# Patient Record
Sex: FEMALE | Race: WHITE | NOT HISPANIC OR LATINO | Employment: OTHER | URBAN - NONMETROPOLITAN AREA
[De-identification: names, ages, dates, MRNs, and addresses within clinical notes are randomized per-mention and may not be internally consistent; named-entity substitution may affect disease eponyms.]

---

## 2017-06-06 ENCOUNTER — TRANSFERRED RECORDS (OUTPATIENT)
Dept: HEALTH INFORMATION MANAGEMENT | Facility: HOSPITAL | Age: 62
End: 2017-06-06

## 2017-08-05 ENCOUNTER — HEALTH MAINTENANCE LETTER (OUTPATIENT)
Age: 62
End: 2017-08-05

## 2018-01-11 ENCOUNTER — OFFICE VISIT (OUTPATIENT)
Dept: FAMILY MEDICINE | Facility: OTHER | Age: 63
End: 2018-01-11
Attending: FAMILY MEDICINE
Payer: COMMERCIAL

## 2018-01-11 VITALS
TEMPERATURE: 98.1 F | HEIGHT: 64 IN | WEIGHT: 164 LBS | DIASTOLIC BLOOD PRESSURE: 78 MMHG | OXYGEN SATURATION: 96 % | BODY MASS INDEX: 28 KG/M2 | RESPIRATION RATE: 14 BRPM | SYSTOLIC BLOOD PRESSURE: 122 MMHG | HEART RATE: 88 BPM

## 2018-01-11 DIAGNOSIS — K21.9 GASTROESOPHAGEAL REFLUX DISEASE, ESOPHAGITIS PRESENCE NOT SPECIFIED: Primary | ICD-10-CM

## 2018-01-11 DIAGNOSIS — K21.9 LPRD (LARYNGOPHARYNGEAL REFLUX DISEASE): ICD-10-CM

## 2018-01-11 LAB
ERYTHROCYTE [DISTWIDTH] IN BLOOD BY AUTOMATED COUNT: 12.9 % (ref 10–15)
HCT VFR BLD AUTO: 40.4 % (ref 35–47)
HGB BLD-MCNC: 13.6 G/DL (ref 11.7–15.7)
MCH RBC QN AUTO: 30.4 PG (ref 26.5–33)
MCHC RBC AUTO-ENTMCNC: 33.7 G/DL (ref 31.5–36.5)
MCV RBC AUTO: 90 FL (ref 78–100)
PLATELET # BLD AUTO: 302 10E9/L (ref 150–450)
RBC # BLD AUTO: 4.48 10E12/L (ref 3.8–5.2)
WBC # BLD AUTO: 7.4 10E9/L (ref 4–11)

## 2018-01-11 PROCEDURE — 36415 COLL VENOUS BLD VENIPUNCTURE: CPT | Performed by: FAMILY MEDICINE

## 2018-01-11 PROCEDURE — 80053 COMPREHEN METABOLIC PANEL: CPT | Performed by: FAMILY MEDICINE

## 2018-01-11 PROCEDURE — 99214 OFFICE O/P EST MOD 30 MIN: CPT | Performed by: FAMILY MEDICINE

## 2018-01-11 PROCEDURE — 85027 COMPLETE CBC AUTOMATED: CPT | Performed by: FAMILY MEDICINE

## 2018-01-11 RX ORDER — OMEPRAZOLE 40 MG/1
40 CAPSULE, DELAYED RELEASE ORAL DAILY
Qty: 30 CAPSULE | Refills: 5 | Status: SHIPPED | OUTPATIENT
Start: 2018-01-11 | End: 2018-10-15

## 2018-01-11 ASSESSMENT — ANXIETY QUESTIONNAIRES
GAD7 TOTAL SCORE: 5
5. BEING SO RESTLESS THAT IT IS HARD TO SIT STILL: NOT AT ALL
2. NOT BEING ABLE TO STOP OR CONTROL WORRYING: SEVERAL DAYS
7. FEELING AFRAID AS IF SOMETHING AWFUL MIGHT HAPPEN: NOT AT ALL
3. WORRYING TOO MUCH ABOUT DIFFERENT THINGS: SEVERAL DAYS
6. BECOMING EASILY ANNOYED OR IRRITABLE: SEVERAL DAYS
1. FEELING NERVOUS, ANXIOUS, OR ON EDGE: SEVERAL DAYS
IF YOU CHECKED OFF ANY PROBLEMS ON THIS QUESTIONNAIRE, HOW DIFFICULT HAVE THESE PROBLEMS MADE IT FOR YOU TO DO YOUR WORK, TAKE CARE OF THINGS AT HOME, OR GET ALONG WITH OTHER PEOPLE: SOMEWHAT DIFFICULT

## 2018-01-11 ASSESSMENT — PATIENT HEALTH QUESTIONNAIRE - PHQ9
SUM OF ALL RESPONSES TO PHQ QUESTIONS 1-9: 5
5. POOR APPETITE OR OVEREATING: SEVERAL DAYS

## 2018-01-11 NOTE — MR AVS SNAPSHOT
After Visit Summary   1/11/2018    Soraida Mclaughlin    MRN: [id number]           Patient Information     Date Of Birth          1955        Visit Information        Provider Department      1/11/2018 3:30 PM Berna Hou MD [facility]        Today's Diagnoses     Gastroesophageal reflux disease, esophagitis presence not specified    -  1    LPRD (laryngopharyngeal reflux disease)           Follow-ups after your visit        Follow-up notes from your care team     Return if symptoms worsen or fail to improve.      Who to contact     If you have questions or need follow up information about today's clinic visit or your schedule please contact [facility] directly at [phone].  Normal or non-critical lab and imaging results will be communicated to you by MyChart, letter or phone within 4 business days after the clinic has received the results. If you do not hear from us within 7 days, please contact the clinic through Dealupahart or phone. If you have a critical or abnormal lab result, we will notify you by phone as soon as possible.  Submit refill requests through Soicos or call your pharmacy and they will forward the refill request to us. Please allow 3 business days for your refill to be completed.          Additional Information About Your Visit        MyChart Information     Soicos gives you secure access to your electronic health record. If you see a primary care provider, you can also send messages to your care team and make appointments. If you have questions, please call your primary care clinic.  If you do not have a primary care provider, please call [phone] and they will assist you.        Care EveryWhere ID     This is your Care EveryWhere ID. This could be used by other organizations to access your [place] medical records  [id number]        Your Vitals Were     Pulse Temperature Respirations Height Pulse Oximetry BMI (Body Mass Index)    88  "98.1  F (36.7  C) (Tympanic) 14 5' 4\" (1.626 m) 96% 28.15 kg/m2       Blood Pressure from Last 3 Encounters:   01/11/18 122/78   12/02/16 126/80   02/09/15 104/70    Weight from Last 3 Encounters:   01/11/18 164 lb (74.4 kg)   12/02/16 160 lb (72.6 kg)   02/09/15 175 lb (79.4 kg)              We Performed the Following     CBC with platelets     Comprehensive metabolic panel          Today's Medication Changes          These changes are accurate as of: 1/11/18 11:59 PM.  If you have any questions, ask your nurse or doctor.               These medicines have changed or have updated prescriptions.        Dose/Directions    omeprazole 40 MG capsule   Commonly known as:  priLOSEC   This may have changed:  See the new instructions.   Used for:  LPRD (laryngopharyngeal reflux disease), Gastroesophageal reflux disease, esophagitis presence not specified   Changed by:  Berna Hou MD        Dose:  40 mg   Take 1 capsule (40 mg) by mouth daily   Quantity:  30 capsule   Refills:  5            Where to get your medicines      These medications were sent to [facility] [id number] [place] [address]  AT [facility] [address]  [address] [facility] [address]     Phone:  [phone]     omeprazole 40 MG capsule                Primary Care Provider Office Phone # Fax #    Berna oHu [phone] [address] [facility] [address]        Equal Access to Services     ARPAN SÁNCHEZ AH: Hadii aad ku hadasho Soomaali, waaxda luqadaha, qaybta kaalmada adeegyada, waxay destin queen. So [facility] [phone].    ATENCIÓN: Si habla español, tiene a vásquez disposición servicios gratuitos de asistencia lingüística. Llame al [phone].    We comply with applicable federal civil rights laws and [place] laws. We do not discriminate on the basis of race, color, national origin, age, disability, sex, sexual orientation, or gender " identity.            Thank you!     Thank you for choosing [facility]  for your care. Our goal is always to provide you with excellent care. Hearing back from our patients is one way we can continue to improve our services. Please take a few minutes to complete the written survey that you may receive in the mail after your visit with us. Thank you!             Your Updated Medication List - Protect others around you: Learn how to safely use, store and throw away your medicines at www.disposemymeds.org.          This list is accurate as of: 1/11/18 11:59 PM.  Always use your most recent med list.                   Brand Name Dispense Instructions for use Diagnosis    cholecalciferol 1000 UNIT tablet    vitamin D3     Take 2 tablets by mouth daily        fish oil-omega-3 fatty acids 1000 MG capsule      Take 2 g by mouth daily        levothyroxine 50 MCG tablet    SYNTHROID/LEVOTHROID     Take 50 mcg by mouth daily        omeprazole 40 MG capsule    priLOSEC    30 capsule    Take 1 capsule (40 mg) by mouth daily    LPRD (laryngopharyngeal reflux disease), Gastroesophageal reflux disease, esophagitis presence not specified       WELLBUTRIN  MG 24 hr tablet   Generic drug:  buPROPion      Take 1 tablet by mouth daily.        ZOCOR 10 MG tablet   Generic drug:  simvastatin      Take 1 tablet by mouth every evening.

## 2018-01-11 NOTE — NURSING NOTE
"Chief Complaint   Patient presents with     Abdominal Pain     heartburn     Other     Dentist is concerned about lack of clotting.       Initial /78 (BP Location: Left arm, Patient Position: Sitting, Cuff Size: Adult Regular)  Pulse 88  Temp 98.1  F (36.7  C) (Tympanic)  Resp 14  Ht 5' 4\" (1.626 m)  Wt 164 lb (74.4 kg)  SpO2 96%  BMI 28.15 kg/m2 Estimated body mass index is 28.15 kg/(m^2) as calculated from the following:    Height as of this encounter: 5' 4\" (1.626 m).    Weight as of this encounter: 164 lb (74.4 kg).  Medication Reconciliation: carmina Lerma      "

## 2018-01-12 DIAGNOSIS — K21.9 GASTROESOPHAGEAL REFLUX DISEASE, ESOPHAGITIS PRESENCE NOT SPECIFIED: ICD-10-CM

## 2018-01-12 LAB
ALBUMIN SERPL-MCNC: 4.2 G/DL (ref 3.4–5)
ALP SERPL-CCNC: 119 U/L (ref 40–150)
ALT SERPL W P-5'-P-CCNC: 31 U/L (ref 0–50)
ANION GAP SERPL CALCULATED.3IONS-SCNC: 8 MMOL/L (ref 3–14)
AST SERPL W P-5'-P-CCNC: 22 U/L (ref 0–45)
BILIRUB SERPL-MCNC: 0.4 MG/DL (ref 0.2–1.3)
BUN SERPL-MCNC: 18 MG/DL (ref 7–30)
CALCIUM SERPL-MCNC: 8.9 MG/DL (ref 8.5–10.1)
CHLORIDE SERPL-SCNC: 108 MMOL/L (ref 94–109)
CO2 SERPL-SCNC: 27 MMOL/L (ref 20–32)
CREAT SERPL-MCNC: 0.69 MG/DL (ref 0.52–1.04)
GFR SERPL CREATININE-BSD FRML MDRD: 86 ML/MIN/1.7M2
GLUCOSE SERPL-MCNC: 83 MG/DL (ref 70–99)
POTASSIUM SERPL-SCNC: 4.2 MMOL/L (ref 3.4–5.3)
PROT SERPL-MCNC: 7.7 G/DL (ref 6.8–8.8)
SODIUM SERPL-SCNC: 143 MMOL/L (ref 133–144)

## 2018-01-12 PROCEDURE — 99000 SPECIMEN HANDLING OFFICE-LAB: CPT | Performed by: FAMILY MEDICINE

## 2018-01-12 PROCEDURE — 87338 HPYLORI STOOL AG IA: CPT | Mod: 90 | Performed by: FAMILY MEDICINE

## 2018-01-12 ASSESSMENT — ANXIETY QUESTIONNAIRES: GAD7 TOTAL SCORE: 5

## 2018-01-15 LAB
H PYLORI AG STL QL IA: NORMAL
SPECIMEN SOURCE: NORMAL

## 2018-03-14 ENCOUNTER — TRANSFERRED RECORDS (OUTPATIENT)
Dept: HEALTH INFORMATION MANAGEMENT | Facility: CLINIC | Age: 63
End: 2018-03-14

## 2018-03-19 ENCOUNTER — OFFICE VISIT (OUTPATIENT)
Dept: FAMILY MEDICINE | Facility: OTHER | Age: 63
End: 2018-03-19
Attending: FAMILY MEDICINE
Payer: COMMERCIAL

## 2018-03-19 VITALS
HEART RATE: 92 BPM | DIASTOLIC BLOOD PRESSURE: 84 MMHG | SYSTOLIC BLOOD PRESSURE: 132 MMHG | OXYGEN SATURATION: 98 % | TEMPERATURE: 97.9 F | RESPIRATION RATE: 14 BRPM | HEIGHT: 64 IN | WEIGHT: 166 LBS | BODY MASS INDEX: 28.34 KG/M2

## 2018-03-19 DIAGNOSIS — F41.9 ANXIETY: ICD-10-CM

## 2018-03-19 DIAGNOSIS — G44.209 TENSION HEADACHE: ICD-10-CM

## 2018-03-19 DIAGNOSIS — K21.9 GASTROESOPHAGEAL REFLUX DISEASE, ESOPHAGITIS PRESENCE NOT SPECIFIED: Primary | ICD-10-CM

## 2018-03-19 PROCEDURE — 99214 OFFICE O/P EST MOD 30 MIN: CPT | Performed by: FAMILY MEDICINE

## 2018-03-19 RX ORDER — BUPROPION HYDROCHLORIDE 150 MG/1
300 TABLET ORAL DAILY
Qty: 30 TABLET | Refills: 2 | Status: SHIPPED | OUTPATIENT
Start: 2018-03-19 | End: 2019-08-08

## 2018-03-19 ASSESSMENT — PAIN SCALES - GENERAL: PAINLEVEL: MILD PAIN (3)

## 2018-03-19 ASSESSMENT — ANXIETY QUESTIONNAIRES
GAD7 TOTAL SCORE: 3
4. TROUBLE RELAXING: SEVERAL DAYS
6. BECOMING EASILY ANNOYED OR IRRITABLE: SEVERAL DAYS
5. BEING SO RESTLESS THAT IT IS HARD TO SIT STILL: NOT AT ALL
7. FEELING AFRAID AS IF SOMETHING AWFUL MIGHT HAPPEN: NOT AT ALL
2. NOT BEING ABLE TO STOP OR CONTROL WORRYING: NOT AT ALL
IF YOU CHECKED OFF ANY PROBLEMS ON THIS QUESTIONNAIRE, HOW DIFFICULT HAVE THESE PROBLEMS MADE IT FOR YOU TO DO YOUR WORK, TAKE CARE OF THINGS AT HOME, OR GET ALONG WITH OTHER PEOPLE: SOMEWHAT DIFFICULT
1. FEELING NERVOUS, ANXIOUS, OR ON EDGE: SEVERAL DAYS
3. WORRYING TOO MUCH ABOUT DIFFERENT THINGS: NOT AT ALL

## 2018-03-19 NOTE — PROGRESS NOTES
SUBJECTIVE:   Soraida Mclaughlin is a 62 year old female who presents to clinic today for the following health issues:      ED/UC Followup:    Facility[facility]  [facility]  Date of visit: 3/14/18  Reason for visit: Chest pains - reflux  Current Status: Not currently having chest pain.          Headache  Onset: 2 weeks     Description:   Location: bilateral in the frontal area & sometimes on left side of head  Character: dull pain  Frequency:  Off and on for two weeks  Duration:  1 hour    Intensity: mild    Progression of Symptoms:  same    Accompanying Signs & Symptoms: jaw pain  Stiff neck: no   Neck or upper back pain: no   Fever: no   Sinus pressure: no   Nausea or vomiting: no   Dizziness: no   Numbness: no   Weakness: no   Visual changes: no     History:   Head trauma: no   Family history of migraines: no   Previous tests for headaches: no   Neurologist evaluations: no   Able to do daily activities: YES  Wake with a headaches: no   Do headaches wake you up: no   Daily pain medication use: no   Work/school stressors/changes: YES    Precipitating factors:   Does light make it worse: YES  Does sound make it worse: no     Alleviating factors:  Does sleep help: YES- sometimes    Therapies Tried and outcome: Tylenol or ibuprofen do help    Patient notes she is under a lot of stress.  She lost her family pet just before the headaches started.  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  She is tearful today discussing all the stressors she has.      Problem list and histories reviewed & adjusted, as indicated.  Additional history: as documented    Patient Active Problem List   Diagnosis     Other specified cardiac dysrhythmias(427.89)     JAMIE (obstructive sleep apnea)     Deviated nasal septum     Nasal turbinate hypertrophy     Benign essential hypertension     Hyperlipidemia     Osteoarthrosis, unspecified whether generalized or localized, ankle and foot     Hypothyroidism     Esophageal reflux      Multinodular goiter     Advanced care planning/counseling discussion     Post-operative state     Past Surgical History:   Procedure Laterality Date     childbirth x 3       fusion 2nd & 3rd metatarsal      LT     hardware removal foot  2004    LT     HERNIA REPAIR       ORTHOPEDIC SURGERY       TONSILLECTOMY Bilateral 9/23/2014    Procedure: TONSILLECTOMY;  Surgeon: Caroline Gao MD;  Location: [facility]     TURBINOPLASTY Bilateral 9/23/2014    Procedure: TURBINOPLASTY;  Surgeon: Caroline Gao MD;  Location: [facility]     UVULOPALATOPHARYNGOPLASTY N/A 9/23/2014    Procedure: UVULOPALATOPHARYNGOPLASTY;  Surgeon: Caroline Gao MD;  Location: [facility] OR       Social History   Substance Use Topics     Smoking status: Former Smoker     Packs/day: 1.00     Years: 10.00     Types: Cigarettes     Smokeless tobacco: Never Used      Comment: quit in 1991.     Alcohol use Yes      Comment: socially 2 drinks/ twice a week     Family History   Problem Relation Age of Onset     CANCER Mother      pancreatic; cause of death     CEREBROVASCULAR DISEASE Father 66     cause of death     Other - See Comments Maternal Grandmother      goiter     Other - See Comments Sister      graves disease     Thyroid Disease Other      hyperthyroidism     Thyroid Disease Other      hypothyroidsim     DIABETES Other      HEART DISEASE Sister      myocardial infarction     Asthma No family hx of          Current Outpatient Prescriptions   Medication Sig Dispense Refill     buPROPion (WELLBUTRIN XL) 150 MG 24 hr tablet Take 2 tablets (300 mg) by mouth daily 30 tablet 2     omeprazole (PRILOSEC) 40 MG capsule Take 1 capsule (40 mg) by mouth daily 30 capsule 5     levothyroxine (SYNTHROID, LEVOTHROID) 50 MCG tablet Take 50 mcg by mouth daily        fish oil-omega-3 fatty acids (FISH OIL) 1000 MG capsule Take 2 g by mouth daily        cholecalciferol (VITAMIN D3) 1000 UNIT tablet Take 2 tablets by mouth daily        simvastatin (ZOCOR) 10 MG  "tablet Take 1 tablet by mouth every evening.       [DISCONTINUED] buPROPion (WELLBUTRIN XL) 150 MG 24 hr tablet Take 1 tablet by mouth daily.       Allergies   Allergen Reactions     Mold        Reviewed and updated as needed this visit by clinical staff  Allergies  Meds  Problems       Reviewed and updated as needed this visit by Provider         ROS:  Constitutional, HEENT, cardiovascular, pulmonary, gi and gu systems are negative, except as otherwise noted.    OBJECTIVE:     /84 (BP Location: Right arm, Patient Position: Chair, Cuff Size: Adult Regular)  Pulse 92  Temp 97.9  F (36.6  C) (Tympanic)  Resp 14  Ht 5' 4\" (1.626 m)  Wt 166 lb (75.3 kg)  SpO2 98%  BMI 28.49 kg/m2  Body mass index is 28.49 kg/(m^2).  GENERAL: healthy, alert and no distress  PSYCH: mentation appears normal, tearful and anxious    Diagnostic Test Results:  Evaluation from ER reviewed in Care Everywhere    ASSESSMENT/PLAN:     1. Gastroesophageal reflux disease, esophagitis presence not specified  Patient will restart daily omeprazole for now.    2. Anxiety  Will increase Wellbutrin to 300 mg daily.  Follow-up in 2-3 weeks for reassessment of symptoms.  - buPROPion (WELLBUTRIN XL) 150 MG 24 hr tablet; Take 2 tablets (300 mg) by mouth daily  Dispense: 30 tablet; Refill: 2    3. Tension headache  Tylenol/ibuprofen, ice, and posture are all discussed.  Will reassess symptoms in 2 weeks, sooner if worsening.          Berna Hou MD  [facility]"

## 2018-03-19 NOTE — NURSING NOTE
"Chief Complaint   Patient presents with     ER F/U     Headache       Initial /84 (BP Location: Right arm, Patient Position: Chair, Cuff Size: Adult Regular)  Pulse 92  Temp 97.9  F (36.6  C) (Tympanic)  Resp 14  Ht 5' 4\" (1.626 m)  Wt 166 lb (75.3 kg)  SpO2 98%  BMI 28.49 kg/m2 Estimated body mass index is 28.49 kg/(m^2) as calculated from the following:    Height as of this encounter: 5' 4\" (1.626 m).    Weight as of this encounter: 166 lb (75.3 kg).  Medication Reconciliation: complete     Olga Dennis   "

## 2018-03-19 NOTE — MR AVS SNAPSHOT
After Visit Summary   3/19/2018    Soraida Mclaughlin    MRN: [id number]           Patient Information     Date Of Birth          1955        Visit Information        Provider Department      3/19/2018 8:30 AM Berna Hou MD [facility]        Today's Diagnoses     Gastroesophageal reflux disease, esophagitis presence not specified    -  1    Anxiety        Tension headache           Follow-ups after your visit        Follow-up notes from your care team     Return in about 3 weeks (around 4/9/2018).      Your next 10 appointments already scheduled     Apr 03, 2018  9:30 AM CDT   (Arrive by 9:15 AM)   SHORT with Berna Hou MD   [facility] ([facility] )    [address]  [facility] [address]   [phone]              Who to contact     If you have questions or need follow up information about today's clinic visit or your schedule please contact [facility] directly at [phone].  Normal or non-critical lab and imaging results will be communicated to you by TRIBAXhart, letter or phone within 4 business days after the clinic has received the results. If you do not hear from us within 7 days, please contact the clinic through ClickPay Servicest or phone. If you have a critical or abnormal lab result, we will notify you by phone as soon as possible.  Submit refill requests through VouchedFor or call your pharmacy and they will forward the refill request to us. Please allow 3 business days for your refill to be completed.          Additional Information About Your Visit        MyChart Information     VouchedFor gives you secure access to your electronic health record. If you see a primary care provider, you can also send messages to your care team and make appointments. If you have questions, please call your primary care clinic.  If you do not have a primary care provider, please call [phone] and they will assist you.    "     Care EveryWhere ID     This is your Care EveryWhere ID. This could be used by other organizations to access your [place] medical records  [id number]        Your Vitals Were     Pulse Temperature Respirations Height Pulse Oximetry BMI (Body Mass Index)    92 97.9  F (36.6  C) (Tympanic) 14 5' 4\" (1.626 m) 98% 28.49 kg/m2       Blood Pressure from Last 3 Encounters:   03/19/18 132/84   01/11/18 122/78   12/02/16 126/80    Weight from Last 3 Encounters:   03/19/18 166 lb (75.3 kg)   01/11/18 164 lb (74.4 kg)   12/02/16 160 lb (72.6 kg)              Today, you had the following     No orders found for display         Today's Medication Changes          These changes are accurate as of 3/19/18 10:27 AM.  If you have any questions, ask your nurse or doctor.               These medicines have changed or have updated prescriptions.        Dose/Directions    buPROPion 150 MG 24 hr tablet   Commonly known as:  WELLBUTRIN XL   This may have changed:  how much to take   Used for:  Anxiety   Changed by:  Berna Hou MD        Dose:  300 mg   Take 2 tablets (300 mg) by mouth daily   Quantity:  30 tablet   Refills:  2         Stop taking these medicines if you haven't already. Please contact your care team if you have questions.     ranitidine 300 MG tablet   Commonly known as:  ZANTAC   Stopped by:  Berna Hou MD                Where to get your medicines      These medications were sent to [facility] [address] [place] [address]  AT [facility] [address] [place] [address]     Phone:  [phone]     buPROPion 150 MG 24 hr tablet                Primary Care Provider Office Phone # Fax #    Berna Hou [phone] [address] [facility] [address]        Equal Access to Services     CATHERINE SÁNCHEZ AH: Sean trejo Sokymberly, waaxda luqadaha, qaybta kaalmanupur keys " lamarifer astorga So [facility] [phone].    ATENCIÓN: Si habla maria ines, tiene a vásquez disposición servicios gratuitos de asistencia lingüística. Natasha al [phone].    We comply with applicable federal civil rights laws and [place] laws. We do not discriminate on the basis of race, color, national origin, age, disability, sex, sexual orientation, or gender identity.            Thank you!     Thank you for choosing [facility]  for your care. Our goal is always to provide you with excellent care. Hearing back from our patients is one way we can continue to improve our services. Please take a few minutes to complete the written survey that you may receive in the mail after your visit with us. Thank you!             Your Updated Medication List - Protect others around you: Learn how to safely use, store and throw away your medicines at www.disposemymeds.org.          This list is accurate as of 3/19/18 10:27 AM.  Always use your most recent med list.                   Brand Name Dispense Instructions for use Diagnosis    buPROPion 150 MG 24 hr tablet    WELLBUTRIN XL    30 tablet    Take 2 tablets (300 mg) by mouth daily    Anxiety       cholecalciferol 1000 UNIT tablet    vitamin D3     Take 2 tablets by mouth daily        fish oil-omega-3 fatty acids 1000 MG capsule      Take 2 g by mouth daily        levothyroxine 50 MCG tablet    SYNTHROID/LEVOTHROID     Take 50 mcg by mouth daily        omeprazole 40 MG capsule    priLOSEC    30 capsule    Take 1 capsule (40 mg) by mouth daily    LPRD (laryngopharyngeal reflux disease), Gastroesophageal reflux disease, esophagitis presence not specified       ZOCOR 10 MG tablet   Generic drug:  simvastatin      Take 1 tablet by mouth every evening.

## 2018-03-20 ASSESSMENT — ANXIETY QUESTIONNAIRES: GAD7 TOTAL SCORE: 3

## 2018-03-20 ASSESSMENT — PATIENT HEALTH QUESTIONNAIRE - PHQ9: SUM OF ALL RESPONSES TO PHQ QUESTIONS 1-9: 5

## 2018-10-15 ENCOUNTER — OFFICE VISIT (OUTPATIENT)
Dept: FAMILY MEDICINE | Facility: OTHER | Age: 63
End: 2018-10-15
Attending: FAMILY MEDICINE
Payer: COMMERCIAL

## 2018-10-15 VITALS
OXYGEN SATURATION: 95 % | SYSTOLIC BLOOD PRESSURE: 128 MMHG | DIASTOLIC BLOOD PRESSURE: 72 MMHG | RESPIRATION RATE: 16 BRPM | TEMPERATURE: 98.4 F | WEIGHT: 172.4 LBS | HEART RATE: 93 BPM | BODY MASS INDEX: 29.43 KG/M2 | HEIGHT: 64 IN

## 2018-10-15 DIAGNOSIS — S82.141S CLOSED FRACTURE OF RIGHT TIBIAL PLATEAU, SEQUELA: ICD-10-CM

## 2018-10-15 DIAGNOSIS — I10 BENIGN ESSENTIAL HYPERTENSION: ICD-10-CM

## 2018-10-15 DIAGNOSIS — E03.9 HYPOTHYROIDISM, UNSPECIFIED TYPE: ICD-10-CM

## 2018-10-15 DIAGNOSIS — Z01.818 PREOP GENERAL PHYSICAL EXAM: Primary | ICD-10-CM

## 2018-10-15 DIAGNOSIS — Z98.890 S/P ORIF (OPEN REDUCTION INTERNAL FIXATION) FRACTURE: ICD-10-CM

## 2018-10-15 DIAGNOSIS — E78.5 HYPERLIPIDEMIA, UNSPECIFIED HYPERLIPIDEMIA TYPE: ICD-10-CM

## 2018-10-15 DIAGNOSIS — Z87.81 S/P ORIF (OPEN REDUCTION INTERNAL FIXATION) FRACTURE: ICD-10-CM

## 2018-10-15 LAB — HGB BLD-MCNC: 14.1 G/DL (ref 11.7–15.7)

## 2018-10-15 PROCEDURE — 93000 ELECTROCARDIOGRAM COMPLETE: CPT | Performed by: INTERNAL MEDICINE

## 2018-10-15 PROCEDURE — 99214 OFFICE O/P EST MOD 30 MIN: CPT | Mod: 25 | Performed by: FAMILY MEDICINE

## 2018-10-15 PROCEDURE — 85018 HEMOGLOBIN: CPT | Performed by: FAMILY MEDICINE

## 2018-10-15 PROCEDURE — 36415 COLL VENOUS BLD VENIPUNCTURE: CPT | Performed by: FAMILY MEDICINE

## 2018-10-15 ASSESSMENT — ANXIETY QUESTIONNAIRES
2. NOT BEING ABLE TO STOP OR CONTROL WORRYING: SEVERAL DAYS
7. FEELING AFRAID AS IF SOMETHING AWFUL MIGHT HAPPEN: NOT AT ALL
5. BEING SO RESTLESS THAT IT IS HARD TO SIT STILL: NOT AT ALL
GAD7 TOTAL SCORE: 3
IF YOU CHECKED OFF ANY PROBLEMS ON THIS QUESTIONNAIRE, HOW DIFFICULT HAVE THESE PROBLEMS MADE IT FOR YOU TO DO YOUR WORK, TAKE CARE OF THINGS AT HOME, OR GET ALONG WITH OTHER PEOPLE: NOT DIFFICULT AT ALL
3. WORRYING TOO MUCH ABOUT DIFFERENT THINGS: NOT AT ALL
6. BECOMING EASILY ANNOYED OR IRRITABLE: SEVERAL DAYS
1. FEELING NERVOUS, ANXIOUS, OR ON EDGE: SEVERAL DAYS

## 2018-10-15 ASSESSMENT — PATIENT HEALTH QUESTIONNAIRE - PHQ9: 5. POOR APPETITE OR OVEREATING: NOT AT ALL

## 2018-10-15 ASSESSMENT — PAIN SCALES - GENERAL: PAINLEVEL: MILD PAIN (2)

## 2018-10-15 NOTE — NURSING NOTE
"Chief Complaint   Patient presents with     Pre-Op Exam       Initial /72 (BP Location: Left arm, Patient Position: Sitting, Cuff Size: Adult Regular)  Pulse 93  Temp 98.4  F (36.9  C) (Tympanic)  Resp 16  Ht 5' 4\" (1.626 m)  Wt 172 lb 6.4 oz (78.2 kg)  SpO2 95%  BMI 29.59 kg/m2 Estimated body mass index is 29.59 kg/(m^2) as calculated from the following:    Height as of this encounter: 5' 4\" (1.626 m).    Weight as of this encounter: 172 lb 6.4 oz (78.2 kg).  Medication Reconciliation: complete    Elvira Rodriguez MA  "

## 2018-10-15 NOTE — MR AVS SNAPSHOT
After Visit Summary   10/15/2018    Soraida Mclaughlin    MRN: [id number]           Patient Information     Date Of Birth          1955        Visit Information        Provider Department      10/15/2018 11:00 AM Berna Hou MD [facility]        Today's Diagnoses     Preop general physical exam    -  1    S/P ORIF (open reduction internal fixation) fracture        Closed fracture of right tibial plateau, sequela        Hyperlipidemia, unspecified hyperlipidemia type        Benign essential hypertension        Hypothyroidism, unspecified type          Care Instructions      Before Your Surgery      Call your surgeon if there is any change in your health. This includes signs of a cold or flu (such as a sore throat, runny nose, cough, rash or fever).    Do not smoke, drink alcohol or take over the counter medicine (unless your surgeon or primary care doctor tells you to) for the 24 hours before and after surgery.    If you take prescribed drugs: Follow your doctor s orders about which medicines to take and which to stop until after surgery.    Eating and drinking prior to surgery: follow the instructions from your surgeon    Take a shower or bath the night before surgery. Use the soap your surgeon gave you to gently clean your skin. If you do not have soap from your surgeon, use your regular soap. Do not shave or scrub the surgery site.  Wear clean pajamas and have clean sheets on your bed.           Follow-ups after your visit        Follow-up notes from your care team     Return if symptoms worsen or fail to improve.      Who to contact     If you have questions or need follow up information about today's clinic visit or your schedule please contact [facility] directly at [phone].  Normal or non-critical lab and imaging results will be communicated to you by MyChart, letter or phone within 4 business days after the clinic has received the  "results. If you do not hear from us within 7 days, please contact the clinic through This Week In or phone. If you have a critical or abnormal lab result, we will notify you by phone as soon as possible.  Submit refill requests through This Week In or call your pharmacy and they will forward the refill request to us. Please allow 3 business days for your refill to be completed.          Additional Information About Your Visit        This Week In Information     This Week In gives you secure access to your electronic health record. If you see a primary care provider, you can also send messages to your care team and make appointments. If you have questions, please call your primary care clinic.  If you do not have a primary care provider, please call [phone] and they will assist you.        Care EveryWhere ID     This is your Care EveryWhere ID. This could be used by other organizations to access your [place] medical records  [id number]        Your Vitals Were     Pulse Temperature Respirations Height Pulse Oximetry BMI (Body Mass Index)    93 98.4  F (36.9  C) (Tympanic) 16 5' 4\" (1.626 m) 95% 29.59 kg/m2       Blood Pressure from Last 3 Encounters:   10/15/18 128/72   03/19/18 132/84   01/11/18 122/78    Weight from Last 3 Encounters:   10/15/18 172 lb 6.4 oz (78.2 kg)   03/19/18 166 lb (75.3 kg)   01/11/18 164 lb (74.4 kg)              We Performed the Following     EKG 12-lead complete w/read - (Clinic Performed)     Hemoglobin          Today's Medication Changes          These changes are accurate as of 10/15/18  1:00 PM.  If you have any questions, ask your nurse or doctor.               These medicines have changed or have updated prescriptions.        Dose/Directions    buPROPion 150 MG 24 hr tablet   Commonly known as:  WELLBUTRIN XL   This may have changed:  how much to take   Used for:  Anxiety        Dose:  300 mg   Take 2 tablets (300 mg) by mouth daily   Quantity:  30 tablet   Refills:  2                Primary Care " Provider Office Phone # Fax #    Berna Hou [phone] [facility] [address]        Equal Access to Services     CATHERINE SÁNCHEZ : Sean Flores, becca paredes, juan miguelarley gonzalezlakshmijuan alberto zunigaapriljuan alberto, nupur bladein hayaacharlie araujopatricemario queen. So [facility] [phone].    ATENCIÓN: Si habla español, tiene a vásquez disposición servicios gratuitos de asistencia lingüística. Llame al [phone].    We comply with applicable federal civil rights laws and [place] laws. We do not discriminate on the basis of race, color, national origin, age, disability, sex, sexual orientation, or gender identity.            Thank you!     Thank you for choosing [facility]  for your care. Our goal is always to provide you with excellent care. Hearing back from our patients is one way we can continue to improve our services. Please take a few minutes to complete the written survey that you may receive in the mail after your visit with us. Thank you!             Your Updated Medication List - Protect others around you: Learn how to safely use, store and throw away your medicines at www.disposemymeds.org.          This list is accurate as of 10/15/18  1:00 PM.  Always use your most recent med list.                   Brand Name Dispense Instructions for use Diagnosis    buPROPion 150 MG 24 hr tablet    WELLBUTRIN XL    30 tablet    Take 2 tablets (300 mg) by mouth daily    Anxiety       cholecalciferol 1000 UNIT tablet    vitamin D3     Take 2 tablets by mouth daily        fish oil-omega-3 fatty acids 1000 MG capsule      Take 2 g by mouth daily        levothyroxine 50 MCG tablet    SYNTHROID/LEVOTHROID     Take 50 mcg by mouth daily        ZOCOR 10 MG tablet   Generic drug:  simvastatin      Take 20 mg by mouth every evening
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
## 2018-10-16 ASSESSMENT — ANXIETY QUESTIONNAIRES: GAD7 TOTAL SCORE: 3

## 2018-10-16 ASSESSMENT — PATIENT HEALTH QUESTIONNAIRE - PHQ9: SUM OF ALL RESPONSES TO PHQ QUESTIONS 1-9: 3

## 2018-10-24 ENCOUNTER — TRANSFERRED RECORDS (OUTPATIENT)
Dept: HEALTH INFORMATION MANAGEMENT | Facility: CLINIC | Age: 63
End: 2018-10-24

## 2019-03-03 ENCOUNTER — TRANSFERRED RECORDS (OUTPATIENT)
Dept: HEALTH INFORMATION MANAGEMENT | Facility: CLINIC | Age: 64
End: 2019-03-03

## 2019-03-03 LAB
ALT SERPL-CCNC: 40 IU/L (ref 6–31)
AST SERPL-CCNC: 33 IU/L (ref 10–40)
CREAT SERPL-MCNC: 0.75 MG/DL (ref 0.4–1)
GFR SERPL CREATININE-BSD FRML MDRD: >60 ML/MIN/1.73M2
GLUCOSE SERPL-MCNC: 90 MG/DL (ref 70–99)
POTASSIUM SERPL-SCNC: 3.8 MEQ/L (ref 3.4–5.1)
TSH SERPL-ACNC: 7.83 UIU/ML (ref 0.4–3.99)

## 2019-03-11 NOTE — PROGRESS NOTES
"  SUBJECTIVE:                                                    Soraida Mclaughlin is a 63 year old female who presents to clinic today for the following health issues:        ED/UC Followup:    Facility:  [facility]  Date of visit: 3/3/2019  Reason for visit: Palpitations  Current Status: stressed          Palpatations      Duration: week    Description (location/character/radiation): Had went out that night - went to brew tasting- had a couple different drinks (only did different/mixture of different drink) went home     Intensity:  moderate    Accompanying signs and symptoms: lightheadedness/ heart palpations \"felt like her heart would stop\" - high B/P 170/112  P  (over 100- possibly 150- can't recall)     History (similar episodes/previous evaluation): years ago- back in college- stress induced  and five years ago ( pulse was fast- put on metoprolol - then seemed to regulate itself- went off medication    Precipitating or alleviating factors: not certain if the mixture of drinks - had an affect on it     Therapies tried and outcome: metoprolol the night of her ER visit     Patient denies any further episodes of symptoms.  She wonders if the work-up ordered in the ER is necessary (echo, Holter, Cardiology).  If necessary, she would like work-up ordered at [facility].           Problem list and histories reviewed & adjusted, as indicated.  Additional history: as documented    Patient Active Problem List   Diagnosis     Other specified cardiac dysrhythmias(427.89)     JAMIE (obstructive sleep apnea)     Deviated nasal septum     Nasal turbinate hypertrophy     Benign essential hypertension     Hyperlipidemia     Osteoarthrosis, unspecified whether generalized or localized, ankle and foot     Hypothyroidism     Esophageal reflux     Multinodular goiter     Advanced care planning/counseling discussion     Post-operative state     Past Surgical History:   Procedure Laterality Date     childbirth x 3       fusion 2nd & 3rd " metatarsal      LT     hardware removal foot  2004    LT     HERNIA REPAIR       ORTHOPEDIC SURGERY       TONSILLECTOMY Bilateral 2014    Procedure: TONSILLECTOMY;  Surgeon: Caroline Gao MD;  [facility]: [facility]     TURBINOPLASTY Bilateral 2014    Procedure: TURBINOPLASTY;  Surgeon: Caroline Gao MD;  [facility]     UVULOPALATOPHARYNGOPLASTY N/A 2014    Procedure: UVULOPALATOPHARYNGOPLASTY;  Surgeon: Caroline Gao MD;  Location: [facility]       Social History     Tobacco Use     Smoking status: Former Smoker     Packs/day: 1.00     Years: 10.00     Pack years: 10.00     Types: Cigarettes     Last attempt to quit: 1991     Years since quittin.2     Smokeless tobacco: Never Used     Tobacco comment: quit in .   Substance Use Topics     Alcohol use: Yes     Comment: socially 2 drinks/ twice a week     Family History   Problem Relation Age of Onset     Cancer Mother         pancreatic; cause of death     Cerebrovascular Disease Father 66        cause of death     Other - See Comments Maternal Grandmother         goiter     Other - See Comments Sister         graves disease     Thyroid Disease Other         hyperthyroidism     Thyroid Disease Other         hypothyroidsim     Diabetes Other      Heart Disease Sister         myocardial infarction     Asthma No family hx of          Current Outpatient Medications   Medication Sig Dispense Refill     buPROPion (WELLBUTRIN XL) 150 MG 24 hr tablet Take 2 tablets (300 mg) by mouth daily (Patient taking differently: Take 150 mg by mouth daily ) 30 tablet 2     cholecalciferol (VITAMIN D3) 1000 UNIT tablet Take 2 tablets by mouth daily        fish oil-omega-3 fatty acids (FISH OIL) 1000 MG capsule Take 2 g by mouth daily        levothyroxine (SYNTHROID, LEVOTHROID) 50 MCG tablet Take 50 mcg by mouth daily        simvastatin (ZOCOR) 10 MG tablet Take 20 mg by mouth every evening        Allergies   Allergen Reactions     Mold   "      ROS:  Constitutional, HEENT, cardiovascular, pulmonary, gi and gu systems are negative, except as otherwise noted.    OBJECTIVE:     /89 (BP Location: Right arm, Patient Position: Chair, Cuff Size: Adult Large)   Pulse 77   Temp 99.2  F (37.3  C) (Tympanic)   Ht 1.626 m (5' 4\")   Wt 75.8 kg (167 lb)   SpO2 95%   BMI 28.67 kg/m    Body mass index is 28.67 kg/m .  GENERAL: healthy, alert and no distress  PSYCH: mentation appears normal, affect normal/bright    Diagnostic Test Results:  none     ASSESSMENT/PLAN:     1. Palpitations  Zio patch ordered for one week, and Cardiology referral ordered to follow.  With alcohol inducing symptoms, I would worry about intermittent a fib.  Go to ER if palpitations return, follow-up here as directed.  Patient expresses understanding and is happy with today's plan.  - Zio Patch (RANGE) Adult Pediatric > 48 hours; Future  - CARDIOLOGY EVAL ADULT REFERRAL        Berna Hou MD  [facility] [place] [facility]      "

## 2019-03-12 ENCOUNTER — OFFICE VISIT (OUTPATIENT)
Dept: FAMILY MEDICINE | Facility: OTHER | Age: 64
End: 2019-03-12
Attending: FAMILY MEDICINE
Payer: COMMERCIAL

## 2019-03-12 VITALS
DIASTOLIC BLOOD PRESSURE: 89 MMHG | SYSTOLIC BLOOD PRESSURE: 130 MMHG | BODY MASS INDEX: 28.51 KG/M2 | HEART RATE: 77 BPM | WEIGHT: 167 LBS | HEIGHT: 64 IN | TEMPERATURE: 99.2 F | OXYGEN SATURATION: 95 %

## 2019-03-12 DIAGNOSIS — R00.2 PALPITATIONS: Primary | ICD-10-CM

## 2019-03-12 PROCEDURE — 99213 OFFICE O/P EST LOW 20 MIN: CPT | Performed by: FAMILY MEDICINE

## 2019-03-12 ASSESSMENT — PAIN SCALES - GENERAL: PAINLEVEL: NO PAIN (0)

## 2019-03-12 ASSESSMENT — MIFFLIN-ST. JEOR: SCORE: 1292.51

## 2019-03-12 NOTE — NURSING NOTE
"Chief Complaint   Patient presents with     Heart Problem       Initial /89 (BP Location: Right arm, Patient Position: Chair, Cuff Size: Adult Large)   Pulse 77   Temp 99.2  F (37.3  C) (Tympanic)   Ht 1.626 m (5' 4\")   Wt 75.8 kg (167 lb)   SpO2 95%   BMI 28.67 kg/m   Estimated body mass index is 28.67 kg/m  as calculated from the following:    Height as of this encounter: 1.626 m (5' 4\").    Weight as of this encounter: 75.8 kg (167 lb).  Medication Reconciliation: complete    Deb Dixon LPN  "

## 2019-03-13 ENCOUNTER — HOSPITAL ENCOUNTER (OUTPATIENT)
Dept: CARDIOLOGY | Facility: HOSPITAL | Age: 64
Discharge: HOME | End: 2019-03-13
Attending: FAMILY MEDICINE | Admitting: FAMILY MEDICINE
Payer: COMMERCIAL

## 2019-03-13 DIAGNOSIS — R00.2 PALPITATIONS: ICD-10-CM

## 2019-03-13 PROCEDURE — 0298T ZIO PATCH HOLTER ADULT PEDIATRIC GREATER THAN 48 HRS: CPT | Performed by: INTERNAL MEDICINE

## 2019-03-13 PROCEDURE — 0296T ZIO PATCH HOLTER ADULT PEDIATRIC GREATER THAN 48 HRS: CPT | Mod: TC

## 2019-04-19 ENCOUNTER — TELEPHONE (OUTPATIENT)
Dept: CARDIOLOGY | Facility: OTHER | Age: 64
End: 2019-04-19
Payer: COMMERCIAL

## 2019-04-19 ENCOUNTER — OFFICE VISIT (OUTPATIENT)
Dept: CARDIOLOGY | Facility: OTHER | Age: 64
End: 2019-04-19
Attending: FAMILY MEDICINE
Payer: COMMERCIAL

## 2019-04-19 VITALS
BODY MASS INDEX: 30.56 KG/M2 | SYSTOLIC BLOOD PRESSURE: 130 MMHG | HEART RATE: 86 BPM | HEIGHT: 64 IN | WEIGHT: 179 LBS | DIASTOLIC BLOOD PRESSURE: 70 MMHG | RESPIRATION RATE: 16 BRPM | OXYGEN SATURATION: 98 % | TEMPERATURE: 98.2 F

## 2019-04-19 DIAGNOSIS — I47.10 PSVT (PAROXYSMAL SUPRAVENTRICULAR TACHYCARDIA) (H): Primary | ICD-10-CM

## 2019-04-19 DIAGNOSIS — G47.33 OSA (OBSTRUCTIVE SLEEP APNEA): ICD-10-CM

## 2019-04-19 DIAGNOSIS — I10 BENIGN ESSENTIAL HYPERTENSION: ICD-10-CM

## 2019-04-19 DIAGNOSIS — E78.2 MIXED HYPERLIPIDEMIA: ICD-10-CM

## 2019-04-19 DIAGNOSIS — K21.9 GASTROESOPHAGEAL REFLUX DISEASE WITHOUT ESOPHAGITIS: ICD-10-CM

## 2019-04-19 DIAGNOSIS — R00.2 PALPITATIONS: ICD-10-CM

## 2019-04-19 DIAGNOSIS — I10 BENIGN ESSENTIAL HYPERTENSION: Primary | ICD-10-CM

## 2019-04-19 DIAGNOSIS — E03.9 HYPOTHYROIDISM, UNSPECIFIED TYPE: ICD-10-CM

## 2019-04-19 DIAGNOSIS — Z79.899 ON STATIN THERAPY: ICD-10-CM

## 2019-04-19 DIAGNOSIS — Z87.891 HISTORY OF TOBACCO ABUSE: ICD-10-CM

## 2019-04-19 PROCEDURE — 99204 OFFICE O/P NEW MOD 45 MIN: CPT | Performed by: INTERNAL MEDICINE

## 2019-04-19 PROCEDURE — 93000 ELECTROCARDIOGRAM COMPLETE: CPT | Performed by: INTERNAL MEDICINE

## 2019-04-19 ASSESSMENT — PAIN SCALES - GENERAL: PAINLEVEL: NO PAIN (0)

## 2019-04-19 ASSESSMENT — MIFFLIN-ST. JEOR: SCORE: 1346.94

## 2019-04-19 NOTE — PATIENT INSTRUCTIONS
You were seen by Dr. Grande, 4/19/2019.     1.  You may choose to do nothing for the palpations if they are not bothersome.        2.  If you may choose to treat the palpitations with daily medications or medication when the symptoms present.    3.  You may choose to have a consult with electrophysiology to discuss possible options.     4.  Please continue all medications as they have been prescribed.     5.  If you develop new or worsening symptoms please call the cardiology office as you may need to be seen sooner.     6.  Please follow up with Dr. Morgan and have your labs done at that time as you request .    7.  You will be referred to sleep medicine for evaluation of sleep apnea. You will be notified to schedule this appointment.       You will follow up with Dr. Grande as needed.       Please call the cardiology office with problems, questions, or concerns at [phone].    If you experience chest pain, chest pressure, chest tightness, shortness of breath, fainting, lightheadedness, nausea, vomiting, or other concerning symptoms, please report to the Emergency Department or call 911. These symptoms may be emergent, and best treated in the Emergency Department.       Lizeth ZEPEDA RN-BSN  Cardiology   [facility]  [phone]      Patient Education     Understanding Heart Palpitations    Heart palpitations are a symptom. It s the feeling you have when your heartbeat seems to be racing, pounding, skipping, or fluttering. Heart palpitations are most often felt in the chest. Sometimes, they may also be felt in the neck.  What causes heart palpitations?  In most cases, heart palpitations are caused by:    Stress or anxiety    Exercise    Pregnancy    Some medicines    Caffeine    Nicotine    Alcohol    Illegal drugs, such as cocaine    Health problems, such as anemia or overactive thyroid  In some cases, heart palpitations may be caused by a problem with the heart. Abnormal heart rhythms  (arrhythmias) are the main concern. They may need to be managed by you and your healthcare provider or treated right away.  How are heart palpitations treated?  Treatments for heart palpitations depend on the cause. Options may include:    Managing the things that trigger your heart palpitations. This could mean:  ? Learning ways to reduce stress and anxiety  ? Avoiding caffeine, nicotine, alcohol, or illegal drugs  ? Stopping the use of certain medicines, under your doctor s guidance    Medicines, procedures, or surgery to treat an arrhythmia or other health problem that is causing your symptoms  What are the complications of heart palpitations?  Complications of heart palpitations are rare unless they are caused by a problem such as an arrhythmia. In such cases, complications can include:    Fainting    Heart failure. This problem occurs when the heart is so weak it no longer pumps blood well.    Blood clots and stroke    Sudden cardiac arrest. This problem occurs when the heart suddenly stops beating.  When should I call my healthcare provider?  Call your healthcare provider right away if you have any of these:    Fever of 100.4 F (38 C) or higher, or as directed    Symptoms that don t get better with treatment, or symptoms that get worse    New symptoms, such as chest pain, shortness of breath, dizziness, or fainting   Date Last Reviewed: 5/1/2016 2000-2018 [employer]. [address] [place] [address], [place] [address]. All rights reserved. This information is not intended as a substitute for professional medical care. Always follow your healthcare professional's instructions.           Patient Education     Valsalva Maneuver  Valsalva maneuver is used to slow the heart when it is beating in a supraventricular tachycardia (SVT). This set of physical maneuvers can trigger a relaxation reflex in the heart's electrical system. This slows down the heart rate and the speed at which the heart's electrical  signals travel down specialized wires. If an SVT is using some of the heart's normal electrical pathways, these maneuvers may slow or stop the abnormal heart rhythm right away.   If you have another episode of SVT that does not stop within a few minutes of lying down, you may try the Valsalva maneuver:    Sit or lie down.    Take a deep breath and hold it.    Now bear down hard with your stomach muscles, as if you were trying to have a bowel movement.    Strain hard and hold the strain for 10 to 15 seconds. The harder you strain, the more likely the maneuver may work.    If this doesn t stop your symptoms, wait for at least 1 minute and try a second time.  What to do next  If this procedure does stop your episode of SVT, call your healthcare provider and let him or her know what happened.  If this procedure does not stop your SVT and the palpitations continue for more than 20 minutes, you may need to go to the emergency department for treatment. Also go right away if you have any of these symptoms along with the palpitations:    Pain in your chest, shoulder, arm, neck, or upper back    Shortness of breath    Weakness    Fainting or lightheadedness  Don't go to your healthcare provider s office or to a clinic instead of the ER. These places will not be able to give you all the testing and treatment needed for this condition.  If symptoms are mild, don t drive yourself to the ER. Have someone else drive you.  Call 911  Call 911 if your symptoms are severe. This is the fastest and safest way to get to the ER, because paramedics can start treatment on the way to the hospital.   Date Last Reviewed: 5/1/2016 2000-2018 [employer]. [address] [place] [address], [place] [address]. All rights reserved. This information is not intended as a substitute for professional medical care. Always follow your healthcare professional's instructions.

## 2019-04-19 NOTE — PROGRESS NOTES
[facility] HEART CARE   CARDIOLOGY CONSULT     Soraida Mclaughlin   1955  [id number]     Berna Hurtado     Chief Complaint   Patient presents with     Heart Problem     Palpitations.          HPI:   Mrs. Mclaughlin is a 63-year-old female who is being seen by cardiology for palpitations.  She was identified as having PSVT, PAC's, and PVC's. She also has a history of JAMIE status post tonsillectomy, GERD, hyperlipidemia, hypertension, history of tobacco abuse quitting in 1/1/91, hypothyroidism, and is currently on statin therapy.    She has had palpitations since her teens or 20's.  She describes having sporadic symptoms as she can go months without symptoms but will have symptoms on a weekly basis.  Generally, her symptoms last seconds.  She will get lightheaded but has never had syncope or near syncope.  She was seen here on 3/3/19 with an extended run of symptoms which she felt lasted for up to 30 minutes.  She feels that her symptoms are complicated by anxiety and is uncertain how much of her symptoms were anxiety related versus rhythm related.      She was appropriately set up with a Zio patch which was performed on 3/13/19.  She was identified with x19 runs of PSVT lasting up to 9.1-seconds, SVE, and VE.  Her symptoms did correlate to PSVT.    In the past, she has been on metoprolol.  She is currently not on this medication.  We discussed ablation versus pill in the pocket versus maintenance medication versus doing nothing.  She was relieved to know that nothing life-threatening was identified as monitor.  For now, she would like to try conservative approach.    We also discussed her history of sleep apnea. Apparently, she was diagnosed with sleep apnea in the past but has since had her tonsils removed as they were described as being large.  She is uncertain at this time if she has sleep apnea.  She was willing to be referred to pulmonary for assessment.    She does not have a lipid panel and A1c on file.   She did have a lipid panel obtained at outside hospital.  She is currently a Zocor 20 mg.  It was suggested she have labs but was turned down.  She would like to discuss it with her primary.  She has a history of smoking.  She quit 1/1/91 after a pack a day.      IMAGING RESULTS:   This is a Zio XT patch report on patient Janeen Mclaughlin ordered by Dr. Morgan for palpitations.    Enrollment period was from 3/13/19 through 3/20/19.  There is 6 days and 20 hours of analysis time available for review.    Patient has underlying sinus rhythm.    The minimum heart rate is 55, average heart rate 93 maximum heart rate 159   bpm when in sinus rhythm.  The fastest heart rate was 235 bpm during a   brief episode of SVT.    There is no significant bradycardia, pauses or heart block seen.    There were rare isolated PACs.  There were 19 episodes of what was felt to be supraventricular tachycardia   4 beats or more duration.  The fastest was for 5 beats with an average HR   of 224 bpm.  The longest was for 9.1 seconds with an average heart rate of 142 bpm.    There were rare isolated PVC's.  There were some episodes of bigeminy and trigeminy seen.  No ventricular   tachycardia.    There is 6 triggered events.  All with underlying sinus rhythm.  3 of them   did have short episodes of SVT lasting anywhere from 5-9 beats.  The other   3 events had isolated PVC's.    There were 2 diary entries for skipped irregular beats.  One had a PVC.    Second did have a short run of SVT.  Lasting 10 beats.    ALLERGIES:   Allergies   Allergen Reactions     Mold         PAST MEDICAL HISTORY:   Past Medical History:   Diagnosis Date     Allergic rhinitis 9/6/2012     DNS (deviated nasal septum) 9/6/2012     Dysphagia 9/6/2012     Esophageal reflux 9/21/2010     Hypertrophy of inferior nasal turbinate 9/6/2012     Multinodular goiter 1/14/2013     JAMIE (obstructive sleep apnea) 9/6/2012     Osteoarthrosis, unspecified whether generalized or  localized, ankle and foot 7/30/2002     Other and unspecified hyperlipidemia 9/21/2010     Unspecified acquired hypothyroidism 9/21/2010     Unspecified essential hypertension 9/21/2010     Uvular hypertrophy 9/6/2012        PAST SURGICAL HISTORY:   Past Surgical History:   Procedure Laterality Date     childbirth x 3       fusion 2nd & 3rd metatarsal      LT     hardware removal foot  2004    LT     HERNIA REPAIR       ORTHOPEDIC SURGERY       TONSILLECTOMY Bilateral 9/23/2014    Procedure: TONSILLECTOMY;  Surgeon: Caroline Gao MD;  Location: [facility]     TURBINOPLASTY Bilateral 9/23/2014    Procedure: TURBINOPLASTY;  Surgeon: Caroline Gao MD;  Location: [facility]     UVULOPALATOPHARYNGOPLASTY N/A 9/23/2014    Procedure: UVULOPALATOPHARYNGOPLASTY;  Surgeon: Caroline Gao MD;  Location: [facility]        FAMILY HISTORY:   Family History   Problem Relation Age of Onset     Cancer Mother         pancreatic; cause of death     Cerebrovascular Disease Father 66        cause of death     Other - See Comments Maternal Grandmother         goiter     Other - See Comments Sister         graves disease     Thyroid Disease Other         hyperthyroidism     Thyroid Disease Other         hypothyroidsim     Diabetes Other      Heart Disease Sister         myocardial infarction     Asthma No family hx of         SOCIAL HISTORY:   Social History     Socioeconomic History     Marital status:      Spouse name: None     Number of children: None     Years of education: None     Highest education level: None   Occupational History     Occupation: [employer] social    Social Needs     Financial resource strain: None     Food insecurity:     Worry: None     Inability: None     Transportation needs:     Medical: None     Non-medical: None   Tobacco Use     Smoking status: Former Smoker     Packs/day: 1.00     Years: 10.00     Pack years: 10.00     Types: Cigarettes     Last attempt  to quit: 1991     Years since quittin.3     Smokeless tobacco: Never Used     Tobacco comment: quit in .   Substance and Sexual Activity     Alcohol use: Yes     Comment: socially 2 drinks/ twice a week     Drug use: No     Comment: quit 20 years ago     Sexual activity: None   Lifestyle     Physical activity:     Days per week: None     Minutes per session: None     Stress: None   Relationships     Social connections:     Talks on phone: None     Gets together: None     Attends [employer] service: None     Active member of club or organization: None     Attends meetings of clubs or organizations: None     Relationship status: None     Intimate partner violence:     Fear of current or ex partner: None     Emotionally abused: None     Physically abused: None     Forced sexual activity: None   Other Topics Concern      Service Not Asked     Blood Transfusions Yes     Caffeine Concern Yes     Comment: coffee - 2 cups daily     Occupational Exposure Not Asked     Hobby Hazards Not Asked     Sleep Concern Not Asked     Stress Concern Not Asked     Weight Concern Not Asked     Special Diet Not Asked     Back Care Not Asked     Exercise Not Asked     Bike Helmet Not Asked     Seat Belt Not Asked     Self-Exams Not Asked     Parent/sibling w/ CABG, MI or angioplasty before 65F 55M? Yes     Comment: sister-mi at 39   Social History Narrative     None         CURRENT MEDICATIONS:   Prior to Admission medications    Medication Sig Start Date End Date Taking? Authorizing Provider   buPROPion (WELLBUTRIN XL) 150 MG 24 hr tablet Take 2 tablets (300 mg) by mouth daily  Patient taking differently: Take 150 mg by mouth daily  3/19/18  Yes Berna Hou MD   cholecalciferol (VITAMIN D3) 1000 UNIT tablet Take 2 tablets by mouth daily    Yes Reported, Patient   fish oil-omega-3 fatty acids (FISH OIL) 1000 MG capsule Take 2 g by mouth daily    Yes Reported, Patient   levothyroxine (SYNTHROID, LEVOTHROID) 50  MCG tablet Take 50 mcg by mouth daily    Yes Reported, Patient   simvastatin (ZOCOR) 10 MG tablet Take 20 mg by mouth every evening    Yes Reported, Patient          ROS:   CONSTITUTIONAL: No weight loss, fever, chills, weakness or fatigue.   HEENT: Eyes: No visual changes. Ears, Nose, Throat: No hearing loss, congestion or difficulty swallowing.   CARDIOVASCULAR: No chest pain, chest pressure or chest discomfort.  Infrequent palpitations but no lower extremity edema.   RESPIRATORY: No shortness of breath, dyspnea upon exertion, cough or sputum production.   GASTROINTESTINAL: No abdominal pain. No anorexia, nausea, vomiting or diarrhea.   NEUROLOGICAL: No headache, lightheadedness, dizziness, syncope, ataxia or weakness.   HEMATOLOGIC: No anemia, bleeding or bruising.   PSYCHIATRIC: No history of depression or anxiety.   ENDOCRINOLOGIC: No reports of sweating, cold or heat intolerance. No polyuria or polydipsia.   SKIN: No abnormal rashes or itching.       PHYSICAL EXAM:   GENERAL: The patient is a well-developed, well-nourished, in no apparent distress. Alert and oriented x3.   HEENT: Head is normocephalic and atraumatic. Eyes are symmetrical with normal visual tracking.  HEART: Regular rate and rhythm, S1S2 present without murmur, rub or gallop.   LUNGS: Respirations regular and unlabored. Clear to auscultation.   GI: Abdomen is soft and nondistended.   EXTREMITIES: No peripheral edema present.   MUSCULOSKELETAL: No joint swelling.   NEUROLOGIC: Alert and oriented X3.    SKIN: No jaundice. No rashes or visible skin lesions present.       LAB RESULTS:   Transferred Records on 03/03/2019   Component Date Value Ref Range Status     TSH 03/03/2019 7.83* 0.40 - 3.99 uIU/mL Final     ALT 03/03/2019 40* 6 - 31 IU/L Final     AST 03/03/2019 33  10 - 40 IU/L Final     Glucose 03/03/2019 90  70 - 99 mg/dL Final     Creatinine 03/03/2019 0.75  0.40 - 1.00 mg/dL Final     GFR Estimate 03/03/2019 >60  >60 ml/min/1.73m2 Final      Potassium 03/03/2019 3.8  3.4 - 5.1 mEq/L Final            ASSESSMENT:       ICD-10-CM    1. PSVT (paroxysmal supraventricular tachycardia) (H) I47.1    2. Palpitations R00.2    3. JAMIE without CPAP G47.33 SLEEP EVALUATION & MANAGEMENT REFERRAL - [facility] [place] [phone] (Age 5 and up)   4. GERD K21.9    5. Mixed hyperlipidemia E78.2    6. Benign essential hypertension I10    7. History of tobacco abuse quitting on 1/1/91 Z87.891    8. Hypothyroidism, unspecified type E03.9    9. On statin therapy Z79.899          PLAN:   1.  We discussed her palpitations as described above.  She was given the options of doing nothing, pill in the pocket, maintenance medication, or consideration for ablation.  She is reassured that nothing life-threatening was identified on the monitor.  Since her symptoms are infrequent, she has chosen a more conservative route and she does not want to be on a medication.  For now, she has turned down surgical intervention, pill in the pocket, or maintenance medications.  If her symptoms accelerate, she may be willing to readdress medical treatment in the future.  2.  She was diagnosed with JAMIE in the past.  She had her tonsils removed improving the situation but she is uncertain if her sleep apnea has completely resolved.  He has been referred to pulmonary for consideration for JAMIE treatment.  3.  It was suggested she have a lipid panel and A1c.  She declined these labs today and would like to readdress this with her primary in the future.  4.  She will be seen in the future on a as needed basis.      Thank you for allowing me to participate in the care of your patient. Please do not hesitate to contact me if you have any questions.     Dmitriy Grande, DO

## 2019-04-19 NOTE — NURSING NOTE
"Chief Complaint   Patient presents with     Heart Problem     Palpitations.       Initial /70 (BP Location: Right arm, Patient Position: Chair, Cuff Size: Adult Regular)   Pulse 86   Temp 98.2  F (36.8  C) (Tympanic)   Resp 16   Ht 1.626 m (5' 4\")   Wt 81.2 kg (179 lb)   SpO2 98%   BMI 30.73 kg/m   Estimated body mass index is 30.73 kg/m  as calculated from the following:    Height as of this encounter: 1.626 m (5' 4\").    Weight as of this encounter: 81.2 kg (179 lb).  Medication Reconciliation: complete    RICHARDSON COFFMAN LPN    "

## 2019-07-29 ENCOUNTER — TRANSFERRED RECORDS (OUTPATIENT)
Dept: HEALTH INFORMATION MANAGEMENT | Facility: CLINIC | Age: 64
End: 2019-07-29

## 2019-08-08 ENCOUNTER — OFFICE VISIT (OUTPATIENT)
Dept: FAMILY MEDICINE | Facility: OTHER | Age: 64
End: 2019-08-08
Attending: FAMILY MEDICINE
Payer: COMMERCIAL

## 2019-08-08 ENCOUNTER — TELEPHONE (OUTPATIENT)
Dept: FAMILY MEDICINE | Facility: OTHER | Age: 64
End: 2019-08-08

## 2019-08-08 VITALS
RESPIRATION RATE: 16 BRPM | OXYGEN SATURATION: 99 % | HEART RATE: 98 BPM | SYSTOLIC BLOOD PRESSURE: 138 MMHG | TEMPERATURE: 99.2 F | BODY MASS INDEX: 29.16 KG/M2 | WEIGHT: 170.8 LBS | DIASTOLIC BLOOD PRESSURE: 72 MMHG | HEIGHT: 64 IN

## 2019-08-08 DIAGNOSIS — S30.1XXD HEMATOMA OF RECTUS SHEATH, SUBSEQUENT ENCOUNTER: Primary | ICD-10-CM

## 2019-08-08 DIAGNOSIS — E78.5 HYPERLIPIDEMIA, UNSPECIFIED HYPERLIPIDEMIA TYPE: ICD-10-CM

## 2019-08-08 DIAGNOSIS — E03.9 HYPOTHYROIDISM, UNSPECIFIED TYPE: ICD-10-CM

## 2019-08-08 DIAGNOSIS — F41.9 ANXIETY: ICD-10-CM

## 2019-08-08 DIAGNOSIS — Z90.49 S/P CHOLECYSTECTOMY: ICD-10-CM

## 2019-08-08 LAB
ALBUMIN SERPL-MCNC: 3.5 G/DL (ref 3.4–5)
ALP SERPL-CCNC: 206 U/L (ref 40–150)
ALT SERPL W P-5'-P-CCNC: 71 U/L (ref 0–50)
ANION GAP SERPL CALCULATED.3IONS-SCNC: 6 MMOL/L (ref 3–14)
AST SERPL W P-5'-P-CCNC: 31 U/L (ref 0–45)
BILIRUB SERPL-MCNC: 0.7 MG/DL (ref 0.2–1.3)
BUN SERPL-MCNC: 11 MG/DL (ref 7–30)
CALCIUM SERPL-MCNC: 9.2 MG/DL (ref 8.5–10.1)
CHLORIDE SERPL-SCNC: 108 MMOL/L (ref 94–109)
CHOLEST SERPL-MCNC: 192 MG/DL
CO2 SERPL-SCNC: 27 MMOL/L (ref 20–32)
CREAT SERPL-MCNC: 0.67 MG/DL (ref 0.52–1.04)
ERYTHROCYTE [DISTWIDTH] IN BLOOD BY AUTOMATED COUNT: 13 % (ref 10–15)
GFR SERPL CREATININE-BSD FRML MDRD: >90 ML/MIN/{1.73_M2}
GLUCOSE SERPL-MCNC: 103 MG/DL (ref 70–99)
HCT VFR BLD AUTO: 38 % (ref 35–47)
HDLC SERPL-MCNC: 45 MG/DL
HGB BLD-MCNC: 12.7 G/DL (ref 11.7–15.7)
LDLC SERPL CALC-MCNC: 121 MG/DL
MCH RBC QN AUTO: 30.7 PG (ref 26.5–33)
MCHC RBC AUTO-ENTMCNC: 33.4 G/DL (ref 31.5–36.5)
MCV RBC AUTO: 92 FL (ref 78–100)
NONHDLC SERPL-MCNC: 147 MG/DL
PLATELET # BLD AUTO: 391 10E9/L (ref 150–450)
POTASSIUM SERPL-SCNC: 4.1 MMOL/L (ref 3.4–5.3)
PROT SERPL-MCNC: 7.6 G/DL (ref 6.8–8.8)
RBC # BLD AUTO: 4.14 10E12/L (ref 3.8–5.2)
SODIUM SERPL-SCNC: 141 MMOL/L (ref 133–144)
TRIGL SERPL-MCNC: 128 MG/DL
TSH SERPL DL<=0.005 MIU/L-ACNC: 2.77 MU/L (ref 0.4–4)
WBC # BLD AUTO: 7.6 10E9/L (ref 4–11)

## 2019-08-08 PROCEDURE — 36415 COLL VENOUS BLD VENIPUNCTURE: CPT | Performed by: FAMILY MEDICINE

## 2019-08-08 PROCEDURE — 84443 ASSAY THYROID STIM HORMONE: CPT | Performed by: FAMILY MEDICINE

## 2019-08-08 PROCEDURE — 80061 LIPID PANEL: CPT | Performed by: FAMILY MEDICINE

## 2019-08-08 PROCEDURE — 80053 COMPREHEN METABOLIC PANEL: CPT | Performed by: FAMILY MEDICINE

## 2019-08-08 PROCEDURE — 99214 OFFICE O/P EST MOD 30 MIN: CPT | Performed by: FAMILY MEDICINE

## 2019-08-08 PROCEDURE — 85027 COMPLETE CBC AUTOMATED: CPT | Performed by: FAMILY MEDICINE

## 2019-08-08 RX ORDER — SIMVASTATIN 20 MG
20 TABLET ORAL AT BEDTIME
Qty: 90 TABLET | Refills: 3 | Status: SHIPPED | OUTPATIENT
Start: 2019-08-08 | End: 2020-08-19

## 2019-08-08 RX ORDER — LEVOTHYROXINE SODIUM 50 UG/1
50 TABLET ORAL DAILY
Qty: 90 TABLET | Refills: 3 | Status: SHIPPED | OUTPATIENT
Start: 2019-08-08 | End: 2020-08-19

## 2019-08-08 RX ORDER — BUPROPION HYDROCHLORIDE 150 MG/1
150 TABLET ORAL DAILY
Qty: 90 TABLET | Refills: 3 | Status: SHIPPED | OUTPATIENT
Start: 2019-08-08 | End: 2020-08-19

## 2019-08-08 ASSESSMENT — PAIN SCALES - GENERAL: PAINLEVEL: NO PAIN (0)

## 2019-08-08 ASSESSMENT — MIFFLIN-ST. JEOR: SCORE: 1309.74

## 2019-08-08 NOTE — PROGRESS NOTES
Subjective     Soraida Mclaughlin is a 63 year old female who presents to clinic today for the following health issues:    HPI       Hospital Follow-up Visit:    Hospital/Nursing Home/IP Rehab Facility: [facility]  Date of Admission: 07/29/19  Date of Discharge: 08/01/19  Reason(s) for Admission: Gastroenteritis, cholecystitis            Problems taking medications regularly:  None       Medication changes since discharge: None       Problems adhering to non-medication therapy:  None    Summary of hospitalization:  CareEverywhere information obtained and reviewed  Diagnostic Tests/Treatments reviewed.  Follow up needed: Surgeon requested follow-up Hgb and LFTs  Other Healthcare Providers Involved in Patient s Care:         Surgical follow-up appointment - Dr. Padilla  Update since discharge: improved.     Post Discharge Medication Reconciliation: discharge medications reconciled, continue medications without change.  Plan of care communicated with patient     Coding guidelines for this visit:  Type of Medical   Decision Making Face-to-Face Visit       within 7 Days of discharge Face-to-Face Visit        within 14 days of discharge   Moderate Complexity [id number]   High Complexity [id number]            Hyperlipidemia Follow-Up      Are you having any of the following symptoms? (Select all that apply)  No complaints of shortness of breath, chest pain or pressure.  No increased sweating or nausea with activity.  No left-sided neck or arm pain.  No complaints of pain in calves when walking 1-2 blocks.    Are you regularly taking any medication or supplement to lower your cholesterol?   Yes- simvastatin    Are you having muscle aches or other side effects that you think could be caused by your cholesterol lowering medication?  No      Depression and Anxiety Follow-Up    How are you doing with your depression since your last visit? No change    How are you doing with your anxiety since your last visit?  No  change    Are you having other symptoms that might be associated with depression or anxiety? No    Have you had a significant life event? No     Do you have any concerns with your use of alcohol or other drugs? No    Social History     Tobacco Use     Smoking status: Former Smoker     Packs/day: 1.00     Years: 10.00     Pack years: 10.00     Types: Cigarettes     Last attempt to quit: 1991     Years since quittin.6     Smokeless tobacco: Never Used     Tobacco comment: quit in .   Substance Use Topics     Alcohol use: Yes     Comment: socially 2 drinks/ twice a week     Drug use: No     Comment: quit 20 years ago     PHQ 2018 3/19/2018 10/15/2018   PHQ-9 Total Score 5 5 3   Q9: Thoughts of better off dead/self-harm past 2 weeks Not at all Not at all Not at all     DEYANIRA-7 SCORE 2018 3/19/2018 10/15/2018   Total Score 5 3 3       Suicide Assessment Five-step Evaluation and Treatment (SAFE-T)      Hypothyroidism Follow-up      Since last visit, patient describes the following symptoms: Weight stable, no hair loss, no skin changes, no constipation, no loose stools      Patient Active Problem List   Diagnosis     Other specified cardiac dysrhythmias(427.89)     JAMIE without CPAP     Deviated nasal septum     Nasal turbinate hypertrophy     Benign essential hypertension     Mixed hyperlipidemia     Osteoarthrosis, unspecified whether generalized or localized, ankle and foot     Hypothyroidism     GERD     Multinodular goiter     Advanced care planning/counseling discussion     Post-operative state     Palpitations     PSVT (paroxysmal supraventricular tachycardia) (H)     History of tobacco abuse quitting on 91     On statin therapy     Past Surgical History:   Procedure Laterality Date     childbirth x 3       fusion 2nd & 3rd metatarsal      LT     GALLBLADDER SURGERY  2019     hardware removal foot  2004    LT     HERNIA REPAIR       ORTHOPEDIC SURGERY       TONSILLECTOMY Bilateral  2014    Procedure: TONSILLECTOMY;  Surgeon: Caroline Gao MD;  Location: [facility]     TURBINOPLASTY Bilateral 2014    Procedure: TURBINOPLASTY;  Surgeon: Caroline Gao MD;  Location: [facility]     UVULOPALATOPHARYNGOPLASTY N/A 2014    Procedure: UVULOPALATOPHARYNGOPLASTY;  Surgeon: Caroline Gao MD;  Location: [facility]       Social History     Tobacco Use     Smoking status: Former Smoker     Packs/day: 1.00     Years: 10.00     Pack years: 10.00     Types: Cigarettes     Last attempt to quit: 1991     Years since quittin.6     Smokeless tobacco: Never Used     Tobacco comment: quit in .   Substance Use Topics     Alcohol use: Yes     Comment: socially 2 drinks/ twice a week     Family History   Problem Relation Age of Onset     Cancer Mother         pancreatic; cause of death     Cerebrovascular Disease Father 66        cause of death     Other - See Comments Maternal Grandmother         goiter     Other - See Comments Sister         graves disease     Thyroid Disease Other         hyperthyroidism     Thyroid Disease Other         hypothyroidsim     Diabetes Other      Heart Disease Sister         myocardial infarction     Asthma No family hx of          Current Outpatient Medications   Medication Sig Dispense Refill     buPROPion (WELLBUTRIN XL) 150 MG 24 hr tablet Take 2 tablets (300 mg) by mouth daily (Patient taking differently: Take 150 mg by mouth daily ) 30 tablet 2     cholecalciferol (VITAMIN D3) 1000 UNIT tablet Take 2 tablets by mouth daily        fish oil-omega-3 fatty acids (FISH OIL) 1000 MG capsule Take 2 g by mouth daily        levothyroxine (SYNTHROID, LEVOTHROID) 50 MCG tablet Take 50 mcg by mouth daily        simvastatin (ZOCOR) 10 MG tablet Take 20 mg by mouth every evening        Allergies   Allergen Reactions     Mold        Reviewed and updated as needed this visit by Provider         Review of Systems   ROS COMP: Constitutional, HEENT,  "cardiovascular, pulmonary, gi and gu systems are negative, except as otherwise noted.      Objective    /72 (BP Location: Left arm, Patient Position: Sitting, Cuff Size: Adult Regular)   Pulse 98   Temp 99.2  F (37.3  C) (Tympanic)   Resp 16   Ht 1.626 m (5' 4\")   Wt 77.5 kg (170 lb 12.8 oz)   SpO2 99%   BMI 29.32 kg/m    Body mass index is 29.32 kg/m .  Physical Exam   GENERAL: healthy, alert and no distress  PSYCH: mentation appears normal, affect normal/bright    Diagnostic Test Results:  Ordered as requested        Assessment & Plan     1. Hematoma of rectus sheath, subsequent encounter  Labs ordered, patient states symptoms have improved.  - CBC with platelets    2. S/P cholecystectomy  We talked about normal diarrhea following surgery.  Labs ordered as requested.  - Comprehensive metabolic panel    3. Hyperlipidemia, unspecified hyperlipidemia type  Labs updated, will renew medications  - Lipid Profile    4. Anxiety  Will renew medications.    5. Hypothyroidism, unspecified type  Labs updated, will renew medications.  - TSH with free T4 reflex     BMI:   Estimated body mass index is 29.32 kg/m  as calculated from the following:    Height as of this encounter: 1.626 m (5' 4\").    Weight as of this encounter: 77.5 kg (170 lb 12.8 oz).         Return in about 6 months (around 2/8/2020) for Chronic Disease Management.    Berna Hou MD  [facility]      "

## 2019-08-08 NOTE — NURSING NOTE
"Chief Complaint   Patient presents with     Hospital F/U       Initial /72 (BP Location: Left arm, Patient Position: Sitting, Cuff Size: Adult Regular)   Pulse 98   Temp 99.2  F (37.3  C) (Tympanic)   Resp 16   Ht 1.626 m (5' 4\")   Wt 77.5 kg (170 lb 12.8 oz)   SpO2 99%   BMI 29.32 kg/m   Estimated body mass index is 29.32 kg/m  as calculated from the following:    Height as of this encounter: 1.626 m (5' 4\").    Weight as of this encounter: 77.5 kg (170 lb 12.8 oz).  Medication Reconciliation: complete  "

## 2019-08-15 ENCOUNTER — TELEPHONE (OUTPATIENT)
Dept: FAMILY MEDICINE | Facility: OTHER | Age: 64
End: 2019-08-15

## 2019-08-15 NOTE — TELEPHONE ENCOUNTER
Patient s/p cholecystectomy   Two weeks   Liver enzymes were up before and after surgery.  Was told by Dr. Morgan to monitor and recheck in one month.  Was told by NP on behalf of surgeon that she had fatty liver disease and she is wondering about this.  She has questions regarding how she could have gotten this and what she will need to do now.  Patient would like to speak with Dr. Hou if she has time, or would it be better to assist patient in scheduling an appointment to discuss?

## 2019-08-15 NOTE — TELEPHONE ENCOUNTER
Patient returned call and she of information by Dr. Morgan. Patient did state she would like to make an appointment out of her next lab check to further discuss information about fatty liver disease. Patient did state understanding. Please advise. Thank you.

## 2019-08-15 NOTE — TELEPHONE ENCOUNTER
I don't see anything in the reports I have about fatty liver disease.  Many people have fatty liver disease, and as long as their liver function (testing) is normal, it is just something to be aware of and limit things that would affect the liver (limit alcohol, tylenol, etc).  We can make next lab check into an appointment and discuss things further then, if she would like.

## 2020-01-16 ENCOUNTER — TRANSFERRED RECORDS (OUTPATIENT)
Dept: HEALTH INFORMATION MANAGEMENT | Facility: CLINIC | Age: 65
End: 2020-01-16

## 2020-01-27 ENCOUNTER — TRANSFERRED RECORDS (OUTPATIENT)
Dept: HEALTH INFORMATION MANAGEMENT | Facility: CLINIC | Age: 65
End: 2020-01-27

## 2020-02-16 ENCOUNTER — HEALTH MAINTENANCE LETTER (OUTPATIENT)
Age: 65
End: 2020-02-16

## 2020-08-17 DIAGNOSIS — E78.5 HYPERLIPIDEMIA, UNSPECIFIED HYPERLIPIDEMIA TYPE: ICD-10-CM

## 2020-08-17 DIAGNOSIS — E03.9 HYPOTHYROIDISM, UNSPECIFIED TYPE: ICD-10-CM

## 2020-08-17 DIAGNOSIS — F41.9 ANXIETY: ICD-10-CM

## 2020-08-18 NOTE — TELEPHONE ENCOUNTER
bupropion      Last Written Prescription Date:  8/8/19  Last Fill Quantity: 90,   # refills: 3  Last Office Visit: 8/8/19  Future Office visit:       Routing refill request to provider for review/approval because:    levothyroxine      Last Written Prescription Date:  8/8/19  Last Fill Quantity: 90,   # refills: 3  Last Office Visit: 8/8/19  Future Office visit:       Routing refill request to provider for review/approval because:    simvastatin      Last Written Prescription Date:  8/8/19  Last Fill Quantity: 90,   # refills: 3  Last Office Visit: 8/8/19  Future Office visit:       Routing refill request to provider for review/approval because:

## 2020-08-19 RX ORDER — SIMVASTATIN 20 MG
TABLET ORAL
Qty: 90 TABLET | Refills: 0 | Status: SHIPPED | OUTPATIENT
Start: 2020-08-19 | End: 2020-11-18

## 2020-08-19 RX ORDER — LEVOTHYROXINE SODIUM 50 UG/1
TABLET ORAL
Qty: 90 TABLET | Refills: 0 | Status: SHIPPED | OUTPATIENT
Start: 2020-08-19 | End: 2020-11-18

## 2020-08-19 RX ORDER — BUPROPION HYDROCHLORIDE 150 MG/1
TABLET ORAL
Qty: 90 TABLET | Refills: 0 | Status: SHIPPED | OUTPATIENT
Start: 2020-08-19 | End: 2020-11-18

## 2020-11-13 DIAGNOSIS — E78.5 HYPERLIPIDEMIA, UNSPECIFIED HYPERLIPIDEMIA TYPE: ICD-10-CM

## 2020-11-13 DIAGNOSIS — F41.9 ANXIETY: ICD-10-CM

## 2020-11-13 DIAGNOSIS — E03.9 HYPOTHYROIDISM, UNSPECIFIED TYPE: ICD-10-CM

## 2020-11-17 NOTE — TELEPHONE ENCOUNTER
wellbutrin       Last Written Prescription Date:  8-  Last Fill Quantity: 90,   # refills: 0  Last Office Visit: 8-8-2019  Future Office visit:       Routing refill request to provider for review/approval because:    Levothyroxine       Last Written Prescription Date:  8-  Last Fill Quantity: 90,   # refills: 0  Last Office Visit: 8-8-2019  Future Office visit:       Routing refill request to provider for review/approval because:    Simvastatin       Last Written Prescription Date:  8-  Last Fill Quantity: 90,   # refills: 0  Last Office Visit: 8-  Future Office visit:       Routing refill request to provider for review/approval because:

## 2020-11-18 RX ORDER — BUPROPION HYDROCHLORIDE 150 MG/1
TABLET ORAL
Qty: 90 TABLET | Refills: 0 | Status: SHIPPED | OUTPATIENT
Start: 2020-11-18 | End: 2021-02-22

## 2020-11-18 RX ORDER — SIMVASTATIN 20 MG
TABLET ORAL
Qty: 90 TABLET | Refills: 0 | Status: SHIPPED | OUTPATIENT
Start: 2020-11-18 | End: 2021-02-22

## 2020-11-18 RX ORDER — LEVOTHYROXINE SODIUM 50 UG/1
TABLET ORAL
Qty: 90 TABLET | Refills: 0 | Status: SHIPPED | OUTPATIENT
Start: 2020-11-18 | End: 2021-02-22

## 2020-11-18 NOTE — PROGRESS NOTES
Subjective     Soraida Mclaughlin is a 64 year old female who presents to clinic today for the following health issues:    HPI         Hyperlipidemia Follow-Up      Are you regularly taking any medication or supplement to lower your cholesterol?   Yes- zocor    Are you having muscle aches or other side effects that you think could be caused by your cholesterol lowering medication?  No    Hypertension Follow-up      Do you check your blood pressure regularly outside of the clinic? Yes     Are you following a low salt diet? No    Are your blood pressures ever more than 140 on the top number (systolic) OR more   than 90 on the bottom number (diastolic), for example 140/90? Yes    Hypothyroidism Follow-up      Since last visit, patient describes the following symptoms: Weight stable, no hair loss, no skin changes, no constipation, no loose stools      How many servings of fruits and vegetables do you eat daily?  2-3    On average, how many sweetened beverages do you drink each day (Examples: soda, juice, sweet tea, etc.  Do NOT count diet or artificially sweetened beverages)?   0    How many days per week do you exercise enough to make your heart beat faster? 3 or less    How many minutes a day do you exercise enough to make your heart beat faster? 30 - 60    How many days per week do you miss taking your medication? 0    Patient is due for pneumovax and tetanus vaccines today.      Patient Active Problem List   Diagnosis     Other specified cardiac dysrhythmias(427.89)     JAMIE without CPAP     Deviated nasal septum     Nasal turbinate hypertrophy     Benign essential hypertension     Mixed hyperlipidemia     Osteoarthrosis, unspecified whether generalized or localized, ankle and foot     Hypothyroidism     GERD     Multinodular goiter     Advanced care planning/counseling discussion     Post-operative state     Palpitations     PSVT (paroxysmal supraventricular tachycardia) (H)     History of tobacco abuse quitting on  91     On statin therapy     Past Surgical History:   Procedure Laterality Date     childbirth x 3       COLONOSCOPY N/A 2020    Performed at [facility] by Dr. VALERY Padilla. Removed 1 hyperplastic polyp. F/U 10 years.      fusion 2nd & 3rd metatarsal      LT     GALLBLADDER SURGERY  2019     hardware removal foot  2004    LT     HERNIA REPAIR       ORTHOPEDIC SURGERY       TONSILLECTOMY Bilateral 2014    Procedure: TONSILLECTOMY;  Surgeon: Caroline Gao MD;  Location: [facility]     TURBINOPLASTY Bilateral 2014    Procedure: TURBINOPLASTY;  Surgeon: Caroline Gao MD;  Location: [facility]     UVULOPALATOPHARYNGOPLASTY N/A 2014    Procedure: UVULOPALATOPHARYNGOPLASTY;  Surgeon: Caroline Gao MD;  Location: [facility]       Social History     Tobacco Use     Smoking status: Former Smoker     Packs/day: 1.00     Years: 10.00     Pack years: 10.00     Types: Cigarettes     Quit date: 1991     Years since quittin.9     Smokeless tobacco: Never Used     Tobacco comment: quit in .   Substance Use Topics     Alcohol use: Yes     Comment: socially 2 drinks/ twice a week     Family History   Problem Relation Age of Onset     Cancer Mother         pancreatic; cause of death     Cerebrovascular Disease Father 66        cause of death     Other - See Comments Maternal Grandmother         goiter     Other - See Comments Sister         graves disease     Thyroid Disease Other         hyperthyroidism     Thyroid Disease Other         hypothyroidsim     Diabetes Other      Heart Disease Sister         myocardial infarction     Asthma No family hx of            Current Outpatient Medications   Medication Sig Dispense Refill     buPROPion (WELLBUTRIN XL) 150 MG 24 hr tablet TAKE 1 TABLET(150 MG) BY MOUTH DAILY 90 tablet 0     cholecalciferol (VITAMIN D3) 1000 UNIT tablet Take 2 tablets by mouth daily        fish oil-omega-3 fatty acids (FISH OIL) 1000 MG capsule Take 2 g by mouth  "daily        levothyroxine (SYNTHROID/LEVOTHROID) 50 MCG tablet TAKE 1 TABLET(50 MCG) BY MOUTH DAILY 90 tablet 0     simvastatin (ZOCOR) 20 MG tablet TAKE 1 TABLET(20 MG) BY MOUTH AT BEDTIME 90 tablet 0     Allergies   Allergen Reactions     Mold        Family History, Social History, Tobacco Use are all reviewed and updated      Review of Systems   Constitutional, HEENT, cardiovascular, pulmonary, gi and gu systems are negative, except as otherwise noted.      Objective    /72 (BP Location: Left arm, Patient Position: Sitting, Cuff Size: Adult Regular)   Pulse 89   Temp 99.2  F (37.3  C) (Tympanic)   Resp 18   Ht 1.626 m (5' 4\")   Wt 73.4 kg (161 lb 12.8 oz)   SpO2 98%   BMI 27.77 kg/m    Body mass index is 27.77 kg/m .  Physical Exam   GENERAL: healthy, alert and no distress  PSYCH: mentation appears normal, affect normal/bright          Assessment & Plan     1. Mixed hyperlipidemia  Labs updated, no changes in medication recommended.  Follow-up in 6 months, sooner as needed.  - Comprehensive metabolic panel  - Lipid Profile (Chol, Trig, HDL, LDL calc)    2. Benign essential hypertension  As above.  - Comprehensive metabolic panel    3. Hypothyroidism, unspecified type  As above  - TSH with free T4 reflex    4. Need for vaccination  Updated  - TD PRESERV FREE, IM (7+ YRS)  - PPSV23, IM/SUBQ (2+ YRS) - [id number]      BMI:   Estimated body mass index is 27.77 kg/m  as calculated from the following:    Height as of this encounter: 1.626 m (5' 4\").    Weight as of this encounter: 73.4 kg (161 lb 12.8 oz).          Return in about 6 months (around 5/19/2021).    Berna Hou MD  [facility]    "

## 2020-11-19 ENCOUNTER — OFFICE VISIT (OUTPATIENT)
Dept: FAMILY MEDICINE | Facility: OTHER | Age: 65
End: 2020-11-19
Attending: FAMILY MEDICINE
Payer: MEDICARE

## 2020-11-19 VITALS
OXYGEN SATURATION: 98 % | TEMPERATURE: 99.2 F | SYSTOLIC BLOOD PRESSURE: 134 MMHG | DIASTOLIC BLOOD PRESSURE: 72 MMHG | RESPIRATION RATE: 18 BRPM | WEIGHT: 161.8 LBS | BODY MASS INDEX: 27.62 KG/M2 | HEIGHT: 64 IN | HEART RATE: 89 BPM

## 2020-11-19 DIAGNOSIS — E03.9 HYPOTHYROIDISM, UNSPECIFIED TYPE: ICD-10-CM

## 2020-11-19 DIAGNOSIS — I10 BENIGN ESSENTIAL HYPERTENSION: ICD-10-CM

## 2020-11-19 DIAGNOSIS — E78.2 MIXED HYPERLIPIDEMIA: Primary | ICD-10-CM

## 2020-11-19 DIAGNOSIS — Z23 NEED FOR VACCINATION: ICD-10-CM

## 2020-11-19 LAB
ALBUMIN SERPL-MCNC: 3.8 G/DL (ref 3.4–5)
ALP SERPL-CCNC: 107 U/L (ref 40–150)
ALT SERPL W P-5'-P-CCNC: 25 U/L (ref 0–50)
ANION GAP SERPL CALCULATED.3IONS-SCNC: 6 MMOL/L (ref 3–14)
AST SERPL W P-5'-P-CCNC: 18 U/L (ref 0–45)
BILIRUB SERPL-MCNC: 0.5 MG/DL (ref 0.2–1.3)
BUN SERPL-MCNC: 13 MG/DL (ref 7–30)
CALCIUM SERPL-MCNC: 8.9 MG/DL (ref 8.5–10.1)
CHLORIDE SERPL-SCNC: 108 MMOL/L (ref 94–109)
CHOLEST SERPL-MCNC: 212 MG/DL
CO2 SERPL-SCNC: 27 MMOL/L (ref 20–32)
CREAT SERPL-MCNC: 0.66 MG/DL (ref 0.52–1.04)
GFR SERPL CREATININE-BSD FRML MDRD: >90 ML/MIN/{1.73_M2}
GLUCOSE SERPL-MCNC: 85 MG/DL (ref 70–99)
HDLC SERPL-MCNC: 72 MG/DL
LDLC SERPL CALC-MCNC: 111 MG/DL
NONHDLC SERPL-MCNC: 140 MG/DL
POTASSIUM SERPL-SCNC: 3.8 MMOL/L (ref 3.4–5.3)
PROT SERPL-MCNC: 7.5 G/DL (ref 6.8–8.8)
SODIUM SERPL-SCNC: 141 MMOL/L (ref 133–144)
TRIGL SERPL-MCNC: 146 MG/DL
TSH SERPL DL<=0.005 MIU/L-ACNC: 1.91 MU/L (ref 0.4–4)

## 2020-11-19 PROCEDURE — 99214 OFFICE O/P EST MOD 30 MIN: CPT | Performed by: FAMILY MEDICINE

## 2020-11-19 PROCEDURE — 80061 LIPID PANEL: CPT | Mod: ZL | Performed by: FAMILY MEDICINE

## 2020-11-19 PROCEDURE — 90472 IMMUNIZATION ADMIN EACH ADD: CPT | Mod: GY

## 2020-11-19 PROCEDURE — G0009 ADMIN PNEUMOCOCCAL VACCINE: HCPCS

## 2020-11-19 PROCEDURE — 84443 ASSAY THYROID STIM HORMONE: CPT | Mod: ZL | Performed by: FAMILY MEDICINE

## 2020-11-19 PROCEDURE — 36415 COLL VENOUS BLD VENIPUNCTURE: CPT | Mod: ZL | Performed by: FAMILY MEDICINE

## 2020-11-19 PROCEDURE — G0463 HOSPITAL OUTPT CLINIC VISIT: HCPCS | Mod: 25

## 2020-11-19 PROCEDURE — 80053 COMPREHEN METABOLIC PANEL: CPT | Mod: ZL | Performed by: FAMILY MEDICINE

## 2020-11-19 ASSESSMENT — ANXIETY QUESTIONNAIRES
1. FEELING NERVOUS, ANXIOUS, OR ON EDGE: SEVERAL DAYS
6. BECOMING EASILY ANNOYED OR IRRITABLE: SEVERAL DAYS
7. FEELING AFRAID AS IF SOMETHING AWFUL MIGHT HAPPEN: NOT AT ALL
IF YOU CHECKED OFF ANY PROBLEMS ON THIS QUESTIONNAIRE, HOW DIFFICULT HAVE THESE PROBLEMS MADE IT FOR YOU TO DO YOUR WORK, TAKE CARE OF THINGS AT HOME, OR GET ALONG WITH OTHER PEOPLE: NOT DIFFICULT AT ALL
3. WORRYING TOO MUCH ABOUT DIFFERENT THINGS: SEVERAL DAYS
GAD7 TOTAL SCORE: 5
2. NOT BEING ABLE TO STOP OR CONTROL WORRYING: SEVERAL DAYS
5. BEING SO RESTLESS THAT IT IS HARD TO SIT STILL: NOT AT ALL
4. TROUBLE RELAXING: SEVERAL DAYS

## 2020-11-19 ASSESSMENT — PAIN SCALES - GENERAL: PAINLEVEL: MILD PAIN (3)

## 2020-11-19 ASSESSMENT — MIFFLIN-ST. JEOR: SCORE: 1263.92

## 2020-11-19 ASSESSMENT — PATIENT HEALTH QUESTIONNAIRE - PHQ9: SUM OF ALL RESPONSES TO PHQ QUESTIONS 1-9: 3

## 2020-11-19 NOTE — NURSING NOTE
"Chief Complaint   Patient presents with     Lipids     Hypertension     Thyroid Problem       Initial /72 (BP Location: Left arm, Patient Position: Sitting, Cuff Size: Adult Regular)   Pulse 89   Temp 99.2  F (37.3  C) (Tympanic)   Resp 18   Ht 1.626 m (5' 4\")   Wt 73.4 kg (161 lb 12.8 oz)   SpO2 98%   BMI 27.77 kg/m   Estimated body mass index is 27.77 kg/m  as calculated from the following:    Height as of this encounter: 1.626 m (5' 4\").    Weight as of this encounter: 73.4 kg (161 lb 12.8 oz).  Medication Reconciliation: complete  Elvira Rodriguez MA  "

## 2020-11-20 ASSESSMENT — ANXIETY QUESTIONNAIRES: GAD7 TOTAL SCORE: 5

## 2021-02-19 ENCOUNTER — MYC MEDICAL ADVICE (OUTPATIENT)
Dept: FAMILY MEDICINE | Facility: OTHER | Age: 66
End: 2021-02-19

## 2021-02-19 DIAGNOSIS — E03.9 HYPOTHYROIDISM, UNSPECIFIED TYPE: ICD-10-CM

## 2021-02-19 DIAGNOSIS — E78.5 HYPERLIPIDEMIA, UNSPECIFIED HYPERLIPIDEMIA TYPE: ICD-10-CM

## 2021-02-19 DIAGNOSIS — F41.9 ANXIETY: ICD-10-CM

## 2021-02-22 RX ORDER — SIMVASTATIN 20 MG
TABLET ORAL
Qty: 90 TABLET | Refills: 1 | Status: SHIPPED | OUTPATIENT
Start: 2021-02-22 | End: 2021-08-12

## 2021-02-22 RX ORDER — LEVOTHYROXINE SODIUM 50 UG/1
TABLET ORAL
Qty: 90 TABLET | Refills: 1 | Status: SHIPPED | OUTPATIENT
Start: 2021-02-22 | End: 2021-08-12

## 2021-02-22 RX ORDER — BUPROPION HYDROCHLORIDE 150 MG/1
TABLET ORAL
Qty: 90 TABLET | Refills: 1 | Status: SHIPPED | OUTPATIENT
Start: 2021-02-22 | End: 2021-08-12

## 2021-02-22 NOTE — TELEPHONE ENCOUNTER
levothyroxine (SYNTHROID/LEVOTHROID) 50 MCG tablet    Last Written Prescription Date:  11/18/2020  Last Fill Quantity: 90,   # refills: 0  Last Office Visit: 11/19/2020      simvastatin (ZOCOR) 20 MG tablet    Last Written Prescription Date:  11/18/2020  Last Fill Quantity: 90,   # refills: 0       buPROPion (WELLBUTRIN XL) 150 MG 24 hr tablet     Last Written Prescription Date:  11/18/2020  Last Fill Quantity: 90,   # refills: 0  Last Office Visit:   Future Office visit:       Routing refill request to provider for review/approval because:

## 2021-04-04 ENCOUNTER — HEALTH MAINTENANCE LETTER (OUTPATIENT)
Age: 66
End: 2021-04-04

## 2021-06-01 NOTE — PROGRESS NOTES
"    Assessment & Plan     1. Mixed hyperlipidemia  No changes to current care plan, labs are UTD    2. Benign essential hypertension  As above.     3. Hypothyroidism, unspecified type  Will recheck levels, just to be sure  - TSH with free T4 reflex    4. Anxiety  See below    5. Insomnia, unspecified type  Will start remeron to help with symptoms, including sleep.  Follow-up in about one month for medication review, sooner as needed.  - mirtazapine (REMERON) 7.5 MG tablet; Take 1 tablet (7.5 mg) by mouth At Bedtime  Dispense: 30 tablet; Refill: 2       BMI:   Estimated body mass index is 26.49 kg/m  as calculated from the following:    Height as of this encounter: 1.626 m (5' 4\").    Weight as of this encounter: 70 kg (154 lb 4.8 oz).     Return in about 4 weeks (around 7/8/2021) for Medication review.    Berna Hou MD  [facility]      [facility]   Marixa is a 65 year old who presents for the following health issues     HPI     Hyperlipidemia Follow-Up      Are you regularly taking any medication or supplement to lower your cholesterol?   Yes- Zocor    Are you having muscle aches or other side effects that you think could be caused by your cholesterol lowering medication?  No    Hypothyroidism Follow-up      Since last visit, patient describes the following symptoms: constipation, loose stools and fatigue    Depression and Anxiety Follow-Up  How are you doing with your depression since your last visit? Worsened   How are you doing with your anxiety since your last visit?  Worsened   Are you having other symptoms that might be associated with depression or anxiety? Yes:  overwhelmed   Have you had a significant life event? OTHER: raising grandchildren    Do you have any concerns with your use of alcohol or other drugs? No   Patient really isn't sleeping well, and does feel that is a lot of her problem.    Social History     Tobacco Use     Smoking status: Former Smoker     Packs/day: " 1.00     Years: 10.00     Pack years: 10.00     Types: Cigarettes     Quit date: 1991     Years since quittin.4     Smokeless tobacco: Never Used     Tobacco comment: quit in .   Substance Use Topics     Alcohol use: Yes     Comment: socially 2 drinks/ twice a week     Drug use: No     Comment: quit 20 years ago     PHQ 10/15/2018 2020 6/10/2021   PHQ-9 Total Score 3 3 8   Q9: Thoughts of better off dead/self-harm past 2 weeks Not at all Not at all Not at all     DEYANIRA-7 SCORE 10/15/2018 2020 6/10/2021   Total Score 3 5 7     Last PHQ-9 6/10/2021   1.  Little interest or pleasure in doing things 1   2.  Feeling down, depressed, or hopeless 1   3.  Trouble falling or staying asleep, or sleeping too much 2   4.  Feeling tired or having little energy 2   5.  Poor appetite or overeating 1   6.  Feeling bad about yourself 0   7.  Trouble concentrating 1   8.  Moving slowly or restless 0   Q9: Thoughts of better off dead/self-harm past 2 weeks 0   PHQ-9 Total Score 8   Difficulty at work, home, or with people Somewhat difficult     DEYANIRA-7  6/10/2021   1. Feeling nervous, anxious, or on edge 1   2. Not being able to stop or control worrying 1   3. Worrying too much about different things 1   4. Trouble relaxing 1   5. Being so restless that it is hard to sit still 1   6. Becoming easily annoyed or irritable 1   7. Feeling afraid, as if something awful might happen 1   DEYANIRA-7 Total Score 7   If you checked any problems, how difficult have they made it for you to do your work, take care of things at home, or get along with other people? Somewhat difficult       Suicide Assessment Five-step Evaluation and Treatment (SAFE-T)    How many servings of fruits and vegetables do you eat daily?  2-3  On average, how many sweetened beverages do you drink each day (Examples: soda, juice, sweet tea, etc.  Do NOT count diet or artificially sweetened beverages)?   0  How many days per week do you exercise enough to  "make your heart beat faster? 7  How many minutes a day do you exercise enough to make your heart beat faster? 30 - 60  How many days per week do you miss taking your medication? 0              Review of Systems   Constitutional, HEENT, cardiovascular, pulmonary, gi and gu systems are negative, except as otherwise noted.      Objective    /76 (BP Location: Right arm, Patient Position: Chair, Cuff Size: Adult Regular)   Pulse 105   Temp 98.7  F (37.1  C) (Tympanic)   Resp 16   Ht 1.626 m (5' 4\")   Wt 70 kg (154 lb 4.8 oz)   SpO2 97%   BMI 26.49 kg/m    Body mass index is 26.49 kg/m .  Physical Exam   GENERAL: healthy, alert and no distress  PSYCH: mentation appears normal, affect normal/bright            "

## 2021-06-10 ENCOUNTER — OFFICE VISIT (OUTPATIENT)
Dept: FAMILY MEDICINE | Facility: OTHER | Age: 66
End: 2021-06-10
Attending: FAMILY MEDICINE
Payer: COMMERCIAL

## 2021-06-10 VITALS
OXYGEN SATURATION: 97 % | DIASTOLIC BLOOD PRESSURE: 76 MMHG | WEIGHT: 154.3 LBS | TEMPERATURE: 98.7 F | BODY MASS INDEX: 26.34 KG/M2 | SYSTOLIC BLOOD PRESSURE: 136 MMHG | HEART RATE: 105 BPM | RESPIRATION RATE: 16 BRPM | HEIGHT: 64 IN

## 2021-06-10 DIAGNOSIS — I10 BENIGN ESSENTIAL HYPERTENSION: ICD-10-CM

## 2021-06-10 DIAGNOSIS — G47.00 INSOMNIA, UNSPECIFIED TYPE: ICD-10-CM

## 2021-06-10 DIAGNOSIS — E03.9 HYPOTHYROIDISM, UNSPECIFIED TYPE: ICD-10-CM

## 2021-06-10 DIAGNOSIS — F41.9 ANXIETY: ICD-10-CM

## 2021-06-10 DIAGNOSIS — E78.2 MIXED HYPERLIPIDEMIA: Primary | ICD-10-CM

## 2021-06-10 PROCEDURE — 99214 OFFICE O/P EST MOD 30 MIN: CPT | Performed by: FAMILY MEDICINE

## 2021-06-10 PROCEDURE — G0463 HOSPITAL OUTPT CLINIC VISIT: HCPCS

## 2021-06-10 PROCEDURE — 84443 ASSAY THYROID STIM HORMONE: CPT | Mod: ZL | Performed by: FAMILY MEDICINE

## 2021-06-10 PROCEDURE — 36415 COLL VENOUS BLD VENIPUNCTURE: CPT | Mod: ZL | Performed by: FAMILY MEDICINE

## 2021-06-10 RX ORDER — MIRTAZAPINE 7.5 MG/1
7.5 TABLET, FILM COATED ORAL AT BEDTIME
Qty: 30 TABLET | Refills: 2 | Status: SHIPPED | OUTPATIENT
Start: 2021-06-10 | End: 2023-03-07

## 2021-06-10 ASSESSMENT — MIFFLIN-ST. JEOR: SCORE: 1224.9

## 2021-06-10 ASSESSMENT — PATIENT HEALTH QUESTIONNAIRE - PHQ9: SUM OF ALL RESPONSES TO PHQ QUESTIONS 1-9: 8

## 2021-06-10 ASSESSMENT — ANXIETY QUESTIONNAIRES
5. BEING SO RESTLESS THAT IT IS HARD TO SIT STILL: SEVERAL DAYS
3. WORRYING TOO MUCH ABOUT DIFFERENT THINGS: SEVERAL DAYS
7. FEELING AFRAID AS IF SOMETHING AWFUL MIGHT HAPPEN: SEVERAL DAYS
1. FEELING NERVOUS, ANXIOUS, OR ON EDGE: SEVERAL DAYS
4. TROUBLE RELAXING: SEVERAL DAYS
6. BECOMING EASILY ANNOYED OR IRRITABLE: SEVERAL DAYS
2. NOT BEING ABLE TO STOP OR CONTROL WORRYING: SEVERAL DAYS
IF YOU CHECKED OFF ANY PROBLEMS ON THIS QUESTIONNAIRE, HOW DIFFICULT HAVE THESE PROBLEMS MADE IT FOR YOU TO DO YOUR WORK, TAKE CARE OF THINGS AT HOME, OR GET ALONG WITH OTHER PEOPLE: SOMEWHAT DIFFICULT
GAD7 TOTAL SCORE: 7

## 2021-06-10 ASSESSMENT — PAIN SCALES - GENERAL: PAINLEVEL: NO PAIN (0)

## 2021-06-11 LAB — TSH SERPL DL<=0.005 MIU/L-ACNC: 2.07 MU/L (ref 0.4–4)

## 2021-06-11 ASSESSMENT — ANXIETY QUESTIONNAIRES: GAD7 TOTAL SCORE: 7

## 2021-08-12 DIAGNOSIS — F41.9 ANXIETY: ICD-10-CM

## 2021-08-12 DIAGNOSIS — E78.5 HYPERLIPIDEMIA, UNSPECIFIED HYPERLIPIDEMIA TYPE: ICD-10-CM

## 2021-08-12 DIAGNOSIS — E03.9 HYPOTHYROIDISM, UNSPECIFIED TYPE: ICD-10-CM

## 2021-08-12 RX ORDER — SIMVASTATIN 20 MG
TABLET ORAL
Qty: 90 TABLET | Refills: 1 | Status: SHIPPED | OUTPATIENT
Start: 2021-08-12 | End: 2022-02-02

## 2021-08-12 RX ORDER — LEVOTHYROXINE SODIUM 50 UG/1
TABLET ORAL
Qty: 90 TABLET | Refills: 1 | Status: SHIPPED | OUTPATIENT
Start: 2021-08-12 | End: 2022-02-02

## 2021-08-12 RX ORDER — BUPROPION HYDROCHLORIDE 150 MG/1
TABLET ORAL
Qty: 90 TABLET | Refills: 1 | Status: SHIPPED | OUTPATIENT
Start: 2021-08-12 | End: 2022-02-02

## 2021-09-19 ENCOUNTER — HEALTH MAINTENANCE LETTER (OUTPATIENT)
Age: 66
End: 2021-09-19

## 2021-10-06 ENCOUNTER — TRANSFERRED RECORDS (OUTPATIENT)
Dept: HEALTH INFORMATION MANAGEMENT | Facility: CLINIC | Age: 66
End: 2021-10-06

## 2022-01-31 DIAGNOSIS — E78.5 HYPERLIPIDEMIA, UNSPECIFIED HYPERLIPIDEMIA TYPE: ICD-10-CM

## 2022-01-31 DIAGNOSIS — F41.9 ANXIETY: ICD-10-CM

## 2022-01-31 DIAGNOSIS — E03.9 HYPOTHYROIDISM, UNSPECIFIED TYPE: ICD-10-CM

## 2022-02-02 RX ORDER — LEVOTHYROXINE SODIUM 50 UG/1
TABLET ORAL
Qty: 90 TABLET | Refills: 0 | Status: SHIPPED | OUTPATIENT
Start: 2022-02-02 | End: 2022-05-03

## 2022-02-02 RX ORDER — BUPROPION HYDROCHLORIDE 150 MG/1
TABLET ORAL
Qty: 90 TABLET | Refills: 0 | Status: SHIPPED | OUTPATIENT
Start: 2022-02-02 | End: 2022-05-03

## 2022-02-02 NOTE — TELEPHONE ENCOUNTER
Zocor      Last Written Prescription Date:  8.12.21  Last Fill Quantity: #90,   # refills: 0  Last Office Visit: 6.10.21  Future Office visit:       Routing refill request to provider for review/approval because:

## 2022-02-03 RX ORDER — SIMVASTATIN 20 MG
TABLET ORAL
Qty: 90 TABLET | Refills: 0 | Status: SHIPPED | OUTPATIENT
Start: 2022-02-03 | End: 2022-05-03

## 2022-02-03 NOTE — TELEPHONE ENCOUNTER
Patient was last seen Carri 10, 4 week follow-up was recommended due to new medication being started.  Please schedule patient for office visit, then please route task back to me to approve refill once appointment scheduled.

## 2022-02-03 NOTE — TELEPHONE ENCOUNTER
Appointments in Next Year      Feb 17, 2022  9:45 AM  (Arrive by 9:30 AM)  SHORT with Berna Hou MD  [facility] ([facility] ) [phone]

## 2022-02-12 NOTE — PROGRESS NOTES
"  Assessment & Plan     1. Mixed hyperlipidemia  Labs updated, will adjust medication if needed.  Follow-up in six months, sooner as needed.  - Comprehensive metabolic panel; Future  - Lipid Profile (Chol, Trig, HDL, LDL calc); Future  - Comprehensive metabolic panel  - Lipid Profile (Chol, Trig, HDL, LDL calc)    2. Benign essential hypertension  As above.  - Comprehensive metabolic panel; Future  - Comprehensive metabolic panel    3. Anxiety  No changes    4. Hypothyroidism, unspecified type  Labs updated  - TSH with free T4 reflex; Future  - TSH with free T4 reflex    5. Menopause  Ordered  - DX Hip/Pelvis/Spine; Future       BMI:   Estimated body mass index is 28.49 kg/m  as calculated from the following:    Height as of this encounter: 1.626 m (5' 4\").    Weight as of this encounter: 75.3 kg (166 lb).     Return in about 6 months (around 8/17/2022) for Chronic Disease Management, Medication review.    Berna Hou MD  [facility] MARIA Calvin is a 66 year old who presents for the following health issues     HPI     Hyperlipidemia Follow-Up      Are you regularly taking any medication or supplement to lower your cholesterol?   Yes- simvastatin    Are you having muscle aches or other side effects that you think could be caused by your cholesterol lowering medication?  No    Hypertension Follow-up      Do you check your blood pressure regularly outside of the clinic? No     Are you following a low salt diet? No    Are your blood pressures ever more than 140 on the top number (systolic) OR more   than 90 on the bottom number (diastolic), for example 140/90? Yes -only during stressful circumstances       Anxiety Follow-Up- Ativan script from Dr. Tietz     How are you doing with your anxiety since your last visit? Waxing and waning situational     Are you having other symptoms that might be associated with anxiety? Yes:  see flowsheets     Have you had a significant life event? " No     Are you feeling depressed? Yes:  occasional     Do you have any concerns with your use of alcohol or other drugs? No    Social History     Tobacco Use     Smoking status: Former Smoker     Packs/day: 1.00     Years: 10.00     Pack years: 10.00     Types: Cigarettes     Quit date: 1991     Years since quittin.1     Smokeless tobacco: Never Used     Tobacco comment: quit in .   Substance Use Topics     Alcohol use: Yes     Comment: socially 2 drinks/ twice a week     Drug use: No     Comment: quit 20 years ago     DEYANIRA-7 SCORE 2020 6/10/2021 2022   Total Score 5 7 7     PHQ 2020 6/10/2021 2022   PHQ-9 Total Score 3 8 4   Q9: Thoughts of better off dead/self-harm past 2 weeks Not at all Not at all Not at all     Last PHQ-9 2022   1.  Little interest or pleasure in doing things 1   2.  Feeling down, depressed, or hopeless 1   3.  Trouble falling or staying asleep, or sleeping too much 1   4.  Feeling tired or having little energy 0   5.  Poor appetite or overeating 0   6.  Feeling bad about yourself 0   7.  Trouble concentrating 1   8.  Moving slowly or restless 0   Q9: Thoughts of better off dead/self-harm past 2 weeks 0   PHQ-9 Total Score 4   Difficulty at work, home, or with people Not difficult at all     DEYANIRA-7  2022   1. Feeling nervous, anxious, or on edge 2   2. Not being able to stop or control worrying 1   3. Worrying too much about different things 1   4. Trouble relaxing 1   5. Being so restless that it is hard to sit still 0   6. Becoming easily annoyed or irritable 1   7. Feeling afraid, as if something awful might happen 1   DEYANIRA-7 Total Score 7   If you checked any problems, how difficult have they made it for you to do your work, take care of things at home, or get along with other people? -       Hypothyroidism Follow-up      Since last visit, patient describes the following symptoms: Weight stable, no hair loss, no skin changes, no constipation, no  "loose stools      How many servings of fruits and vegetables do you eat daily?  2-3    On average, how many sweetened beverages do you drink each day (Examples: soda, juice, sweet tea, etc.  Do NOT count diet or artificially sweetened beverages)?   0    How many days per week do you exercise enough to make your heart beat faster? 3 or less    How many minutes a day do you exercise enough to make your heart beat faster? 20 - 29    How many days per week do you miss taking your medication? 0      Review of Systems   Constitutional, HEENT, cardiovascular, pulmonary, gi and gu systems are negative, except as otherwise noted.      Objective    /66 (BP Location: Right arm, Patient Position: Chair, Cuff Size: Adult Large)   Pulse 96   Temp 98.6  F (37  C) (Tympanic)   Ht 1.626 m (5' 4\")   Wt 75.3 kg (166 lb)   SpO2 97%   BMI 28.49 kg/m    Body mass index is 28.49 kg/m .  Physical Exam   GENERAL: healthy, alert and no distress  PSYCH: mentation appears normal, affect normal/bright            "

## 2022-02-14 ENCOUNTER — NURSE TRIAGE (OUTPATIENT)
Dept: FAMILY MEDICINE | Facility: OTHER | Age: 67
End: 2022-02-14
Payer: COMMERCIAL

## 2022-02-14 ENCOUNTER — OFFICE VISIT (OUTPATIENT)
Dept: FAMILY MEDICINE | Facility: OTHER | Age: 67
End: 2022-02-14
Attending: FAMILY MEDICINE
Payer: MEDICARE

## 2022-02-14 DIAGNOSIS — J02.0 STREPTOCOCCAL SORE THROAT: ICD-10-CM

## 2022-02-14 DIAGNOSIS — J02.0 STREPTOCOCCAL SORE THROAT: Primary | ICD-10-CM

## 2022-02-14 LAB
DEPRECATED S PYO AG THROAT QL EIA: NEGATIVE
GROUP A STREP BY PCR: NOT DETECTED

## 2022-02-14 PROCEDURE — 87651 STREP A DNA AMP PROBE: CPT | Mod: ZL

## 2022-02-14 NOTE — TELEPHONE ENCOUNTER
"Pt called reports she has an appointment scheduled to see PCP on Thursday. Pt is requesting strep throat swab today [facility]. Pt reports two recent negative home covid tests. Triage assessment and sx below.     Order pended PCP to advise on plan.       [phone]        Reason for Disposition    SEVERE (e.g., excruciating) throat pain    Additional Information    Negative: Severe difficulty breathing (e.g., struggling for each breath, speaks in single words, stridor)    Negative: Sounds like a life-threatening emergency to the triager    Negative: [1] Diagnosed strep throat AND [2] taking antibiotic AND [3] symptoms continue    Negative: Throat culture results, call about    Negative: Productive cough is main symptom    Negative: Non-productive cough is main symptom    Negative: Hoarseness is main symptom    Negative: Runny nose is main symptom    Negative: [1] Drooling or spitting out saliva (because can't swallow) AND [2] normal breathing    Negative: Unable to open mouth completely    Negative: [1] Difficulty breathing AND [2] not severe    Negative: Fever > 104 F (40 C)    Negative: [1] Refuses to drink anything AND [2] for > 12 hours    Negative: [1] Drinking very little AND [2] dehydration suspected (e.g., no urine > 12 hours, very dry mouth, very lightheaded)    Negative: Patient sounds very sick or weak to the triager    Answer Assessment - Initial Assessment Questions  1. ONSET: \"When did the throat start hurting?\" (Hours or days ago)       2/13/22  2. SEVERITY: \"How bad is the sore throat?\" (Scale 1-10; mild, moderate or severe)    - MILD (1-3):  doesn't interfere with eating or normal activities    - MODERATE (4-7): interferes with eating some solids and normal activities    - SEVERE (8-10):  excruciating pain, interferes with most normal activities    - SEVERE DYSPHAGIA: can't swallow liquids, drooling      At night severe during the day moderate. When swallowing food burning in throat  3. STREP " "EXPOSURE: \"Has there been any exposure to strep within the past week?\" If so, ask: \"What type of contact occurred?\"       no  4.  VIRAL SYMPTOMS: \"Are there any symptoms of a cold, such as a runny nose, cough, hoarse voice or red eyes?\"       \"always have a runny nose due to my allergies\" denies any other sx  5. FEVER: \"Do you have a fever?\" If so, ask: \"What is your temperature, how was it measured, and when did it start?\"      no  6. PUS ON THE TONSILS: \"Is there pus on the tonsils in the back of your throat?\"      no  7. OTHER SYMPTOMS: \"Do you have any other symptoms?\" (e.g., difficulty breathing, headache, rash)      denies  8. PREGNANCY: \"Is there any chance you are pregnant?\" \"When was your last menstrual period?\"      no    Protocols used: SORE THROAT-A-AH      "

## 2022-02-17 ENCOUNTER — OFFICE VISIT (OUTPATIENT)
Dept: FAMILY MEDICINE | Facility: OTHER | Age: 67
End: 2022-02-17
Attending: FAMILY MEDICINE
Payer: COMMERCIAL

## 2022-02-17 ENCOUNTER — TRANSFERRED RECORDS (OUTPATIENT)
Dept: HEALTH INFORMATION MANAGEMENT | Facility: CLINIC | Age: 67
End: 2022-02-17

## 2022-02-17 VITALS
DIASTOLIC BLOOD PRESSURE: 66 MMHG | HEART RATE: 96 BPM | BODY MASS INDEX: 28.34 KG/M2 | SYSTOLIC BLOOD PRESSURE: 128 MMHG | TEMPERATURE: 98.6 F | OXYGEN SATURATION: 97 % | WEIGHT: 166 LBS | HEIGHT: 64 IN

## 2022-02-17 DIAGNOSIS — E03.9 HYPOTHYROIDISM, UNSPECIFIED TYPE: ICD-10-CM

## 2022-02-17 DIAGNOSIS — E78.2 MIXED HYPERLIPIDEMIA: Primary | ICD-10-CM

## 2022-02-17 DIAGNOSIS — Z78.0 MENOPAUSE: ICD-10-CM

## 2022-02-17 DIAGNOSIS — I10 BENIGN ESSENTIAL HYPERTENSION: ICD-10-CM

## 2022-02-17 DIAGNOSIS — F41.9 ANXIETY: ICD-10-CM

## 2022-02-17 LAB
ALBUMIN SERPL-MCNC: 4 G/DL (ref 3.4–5)
ALP SERPL-CCNC: 115 U/L (ref 40–150)
ALT SERPL W P-5'-P-CCNC: 37 U/L (ref 0–50)
ANION GAP SERPL CALCULATED.3IONS-SCNC: 5 MMOL/L (ref 3–14)
AST SERPL W P-5'-P-CCNC: 29 U/L (ref 0–45)
BILIRUB SERPL-MCNC: 0.4 MG/DL (ref 0.2–1.3)
BUN SERPL-MCNC: 16 MG/DL (ref 7–30)
CALCIUM SERPL-MCNC: 9.3 MG/DL (ref 8.5–10.1)
CHLORIDE BLD-SCNC: 107 MMOL/L (ref 94–109)
CHOLEST SERPL-MCNC: 173 MG/DL
CO2 SERPL-SCNC: 27 MMOL/L (ref 20–32)
CREAT SERPL-MCNC: 0.65 MG/DL (ref 0.52–1.04)
FASTING STATUS PATIENT QL REPORTED: NO
GFR SERPL CREATININE-BSD FRML MDRD: >90 ML/MIN/1.73M2
GLUCOSE BLD-MCNC: 105 MG/DL (ref 70–99)
HDLC SERPL-MCNC: 65 MG/DL
HOLD SPECIMEN: NORMAL
LDLC SERPL CALC-MCNC: 94 MG/DL
NONHDLC SERPL-MCNC: 108 MG/DL
POTASSIUM BLD-SCNC: 4.2 MMOL/L (ref 3.4–5.3)
PROT SERPL-MCNC: 7.9 G/DL (ref 6.8–8.8)
SODIUM SERPL-SCNC: 139 MMOL/L (ref 133–144)
TRIGL SERPL-MCNC: 71 MG/DL
TSH SERPL DL<=0.005 MIU/L-ACNC: 2.7 MU/L (ref 0.4–4)

## 2022-02-17 PROCEDURE — G0463 HOSPITAL OUTPT CLINIC VISIT: HCPCS

## 2022-02-17 PROCEDURE — 36415 COLL VENOUS BLD VENIPUNCTURE: CPT | Mod: ZL | Performed by: FAMILY MEDICINE

## 2022-02-17 PROCEDURE — 80053 COMPREHEN METABOLIC PANEL: CPT | Mod: ZL | Performed by: FAMILY MEDICINE

## 2022-02-17 PROCEDURE — 80061 LIPID PANEL: CPT | Mod: ZL | Performed by: FAMILY MEDICINE

## 2022-02-17 PROCEDURE — 84443 ASSAY THYROID STIM HORMONE: CPT | Mod: ZL | Performed by: FAMILY MEDICINE

## 2022-02-17 PROCEDURE — 99214 OFFICE O/P EST MOD 30 MIN: CPT | Performed by: FAMILY MEDICINE

## 2022-02-17 RX ORDER — LORAZEPAM 0.5 MG/1
TABLET ORAL
COMMUNITY
Start: 2021-12-26 | End: 2023-10-23

## 2022-02-17 ASSESSMENT — ANXIETY QUESTIONNAIRES
3. WORRYING TOO MUCH ABOUT DIFFERENT THINGS: SEVERAL DAYS
4. TROUBLE RELAXING: SEVERAL DAYS
7. FEELING AFRAID AS IF SOMETHING AWFUL MIGHT HAPPEN: SEVERAL DAYS
2. NOT BEING ABLE TO STOP OR CONTROL WORRYING: SEVERAL DAYS
1. FEELING NERVOUS, ANXIOUS, OR ON EDGE: MORE THAN HALF THE DAYS
GAD7 TOTAL SCORE: 7
6. BECOMING EASILY ANNOYED OR IRRITABLE: SEVERAL DAYS
5. BEING SO RESTLESS THAT IT IS HARD TO SIT STILL: NOT AT ALL

## 2022-02-17 ASSESSMENT — PAIN SCALES - GENERAL: PAINLEVEL: NO PAIN (0)

## 2022-02-17 ASSESSMENT — PATIENT HEALTH QUESTIONNAIRE - PHQ9: SUM OF ALL RESPONSES TO PHQ QUESTIONS 1-9: 4

## 2022-02-17 NOTE — NURSING NOTE
"Chief Complaint   Patient presents with     Lipids     Hypertension     Anxiety     Thyroid Disease       Initial /66 (BP Location: Right arm, Patient Position: Chair, Cuff Size: Adult Large)   Pulse 96   Temp 98.6  F (37  C) (Tympanic)   Ht 1.626 m (5' 4\")   Wt 75.3 kg (166 lb)   SpO2 97%   BMI 28.49 kg/m   Estimated body mass index is 28.49 kg/m  as calculated from the following:    Height as of this encounter: 1.626 m (5' 4\").    Weight as of this encounter: 75.3 kg (166 lb).  Medication Reconciliation: complete  Hailey Cheung LPN    "

## 2022-02-18 ASSESSMENT — ANXIETY QUESTIONNAIRES: GAD7 TOTAL SCORE: 7

## 2022-02-24 ENCOUNTER — HOSPITAL ENCOUNTER (OUTPATIENT)
Dept: BONE DENSITY | Facility: HOSPITAL | Age: 67
Discharge: HOME | End: 2022-02-24
Attending: FAMILY MEDICINE | Admitting: FAMILY MEDICINE
Payer: MEDICARE

## 2022-02-24 DIAGNOSIS — Z78.0 MENOPAUSE: ICD-10-CM

## 2022-02-24 PROCEDURE — 77080 DXA BONE DENSITY AXIAL: CPT

## 2022-04-28 DIAGNOSIS — E78.5 HYPERLIPIDEMIA, UNSPECIFIED HYPERLIPIDEMIA TYPE: ICD-10-CM

## 2022-04-28 DIAGNOSIS — E03.9 HYPOTHYROIDISM, UNSPECIFIED TYPE: ICD-10-CM

## 2022-04-28 DIAGNOSIS — F41.9 ANXIETY: ICD-10-CM

## 2022-05-01 ENCOUNTER — HEALTH MAINTENANCE LETTER (OUTPATIENT)
Age: 67
End: 2022-05-01

## 2022-05-03 RX ORDER — LEVOTHYROXINE SODIUM 50 UG/1
TABLET ORAL
Qty: 90 TABLET | Refills: 0 | Status: SHIPPED | OUTPATIENT
Start: 2022-05-03 | End: 2022-07-26

## 2022-05-03 RX ORDER — BUPROPION HYDROCHLORIDE 150 MG/1
TABLET ORAL
Qty: 90 TABLET | Refills: 0 | Status: SHIPPED | OUTPATIENT
Start: 2022-05-03 | End: 2022-07-26

## 2022-05-03 RX ORDER — SIMVASTATIN 20 MG
TABLET ORAL
Qty: 90 TABLET | Refills: 0 | Status: SHIPPED | OUTPATIENT
Start: 2022-05-03 | End: 2022-08-02

## 2022-05-03 NOTE — TELEPHONE ENCOUNTER
lov 02/17/2022  simvastatin (ZOCOR) 20 MG tablet 90 tablet 0 2/3/2022     levothyroxine (SYNTHROID/LEVOTHROID) 50 MCG tablet 90 tablet 0 2/2/2022       buPROPion (WELLBUTRIN XL) 150 MG 24 hr tablet 90 tablet 0 2/2/2022

## 2022-07-26 DIAGNOSIS — F41.9 ANXIETY: ICD-10-CM

## 2022-07-26 DIAGNOSIS — E03.9 HYPOTHYROIDISM, UNSPECIFIED TYPE: ICD-10-CM

## 2022-07-26 RX ORDER — LEVOTHYROXINE SODIUM 50 UG/1
TABLET ORAL
Qty: 90 TABLET | Refills: 0 | Status: SHIPPED | OUTPATIENT
Start: 2022-07-26 | End: 2022-11-02

## 2022-07-26 RX ORDER — BUPROPION HYDROCHLORIDE 150 MG/1
TABLET ORAL
Qty: 90 TABLET | Refills: 0 | Status: SHIPPED | OUTPATIENT
Start: 2022-07-26 | End: 2022-11-02

## 2022-07-31 DIAGNOSIS — E78.5 HYPERLIPIDEMIA, UNSPECIFIED HYPERLIPIDEMIA TYPE: ICD-10-CM

## 2022-08-02 RX ORDER — SIMVASTATIN 20 MG
TABLET ORAL
Qty: 90 TABLET | Refills: 0 | Status: SHIPPED | OUTPATIENT
Start: 2022-08-02 | End: 2022-11-08

## 2022-08-15 NOTE — PROGRESS NOTES
"  Assessment & Plan     1. Hyperlipidemia, unspecified hyperlipidemia type  Labs updated, will refill medication when due.  Follow-up in six months, sooner as needed.  - Lipid Profile (Chol, Trig, HDL, LDL calc); Future  - ALT; Future    2. Benign essential hypertension  As above.  - Basic metabolic panel; Future    3. Anxiety  No changes    4. Hypothyroidism, unspecified type  Updated  - TSH with free T4 reflex; Future    5. PSVT (paroxysmal supraventricular tachycardia) (H)  No changes       BMI:   Estimated body mass index is 28.24 kg/m  as calculated from the following:    Height as of 2/17/22: 1.626 m (5' 4\").    Weight as of this encounter: 74.6 kg (164 lb 8 oz).     Return in about 6 months (around 3/2/2023) for Chronic Disease Management, Medication review.    Berna Hou MD  [facility] [place] [facility] Calvin is a 66 year old, presenting for the following health issues:  Lipids, Hypertension, and Thyroid Problem      HPI     Hyperlipidemia Follow-Up      Are you regularly taking any medication or supplement to lower your cholesterol?   Yes- Simvastatin    Are you having muscle aches or other side effects that you think could be caused by your cholesterol lowering medication?  No    Hypertension Follow-up      Do you check your blood pressure regularly outside of the clinic? Yes     Are you following a low salt diet? Yes    Are your blood pressures ever more than 140 on the top number (systolic) OR more   than 90 on the bottom number (diastolic), for example 140/90? Yes     Anxiety Follow-Up    How are you doing with your anxiety since your last visit? No change    Are you having other symptoms that might be associated with anxiety? No    Have you had a significant life event? No     Are you feeling depressed? No    Do you have any concerns with your use of alcohol or other drugs? No    Social History     Tobacco Use     Smoking status: Former Smoker     Packs/day: 1.00     " Years: 10.00     Pack years: 10.00     Types: Cigarettes     Quit date: 1991     Years since quittin.6     Smokeless tobacco: Never Used     Tobacco comment: quit in .   Substance Use Topics     Alcohol use: Yes     Comment: socially 2 drinks/ twice a week     Drug use: No     Comment: quit 20 years ago     DEYANIRA-7 SCORE 6/10/2021 2022 2022   Total Score 7 7 8     PHQ 6/10/2021 2022 2022   PHQ-9 Total Score 8 4 10   Q9: Thoughts of better off dead/self-harm past 2 weeks Not at all Not at all Not at all       Hypothyroidism Follow-up      Since last visit, patient describes the following symptoms: Weight stable, no hair loss, no skin changes, no constipation, no loose stools, constipation, anxiety and depression      How many servings of fruits and vegetables do you eat daily?  0-1    On average, how many sweetened beverages do you drink each day (Examples: soda, juice, sweet tea, etc.  Do NOT count diet or artificially sweetened beverages)?   0    How many days per week do you exercise enough to make your heart beat faster? 4    How many minutes a day do you exercise enough to make your heart beat faster? 10 - 19    How many days per week do you miss taking your medication? 0      Review of Systems   Constitutional, HEENT, cardiovascular, pulmonary, gi and gu systems are negative, except as otherwise noted.      Objective    /78 (BP Location: Right arm, Patient Position: Chair, Cuff Size: Adult Regular)   Pulse 62   Temp 99.1  F (37.3  C) (Tympanic)   Resp 18   Wt 74.6 kg (164 lb 8 oz)   SpO2 96%   BMI 28.24 kg/m    Body mass index is 28.24 kg/m .  Physical Exam   GENERAL: healthy, alert and no distress  PSYCH: mentation appears normal, affect normal/bright                .  ..

## 2022-09-02 ENCOUNTER — OFFICE VISIT (OUTPATIENT)
Dept: FAMILY MEDICINE | Facility: OTHER | Age: 67
End: 2022-09-02
Attending: FAMILY MEDICINE
Payer: COMMERCIAL

## 2022-09-02 VITALS
SYSTOLIC BLOOD PRESSURE: 126 MMHG | RESPIRATION RATE: 18 BRPM | HEART RATE: 62 BPM | DIASTOLIC BLOOD PRESSURE: 78 MMHG | TEMPERATURE: 99.1 F | BODY MASS INDEX: 28.24 KG/M2 | OXYGEN SATURATION: 96 % | WEIGHT: 164.5 LBS

## 2022-09-02 DIAGNOSIS — E03.9 HYPOTHYROIDISM, UNSPECIFIED TYPE: ICD-10-CM

## 2022-09-02 DIAGNOSIS — I47.10 PSVT (PAROXYSMAL SUPRAVENTRICULAR TACHYCARDIA) (H): ICD-10-CM

## 2022-09-02 DIAGNOSIS — E78.5 HYPERLIPIDEMIA, UNSPECIFIED HYPERLIPIDEMIA TYPE: Primary | ICD-10-CM

## 2022-09-02 DIAGNOSIS — F41.9 ANXIETY: ICD-10-CM

## 2022-09-02 DIAGNOSIS — I10 BENIGN ESSENTIAL HYPERTENSION: ICD-10-CM

## 2022-09-02 LAB
ALT SERPL W P-5'-P-CCNC: 37 U/L (ref 0–50)
ANION GAP SERPL CALCULATED.3IONS-SCNC: 5 MMOL/L (ref 3–14)
BUN SERPL-MCNC: 14 MG/DL (ref 7–30)
CALCIUM SERPL-MCNC: 9 MG/DL (ref 8.5–10.1)
CHLORIDE BLD-SCNC: 108 MMOL/L (ref 94–109)
CHOLEST SERPL-MCNC: 199 MG/DL
CO2 SERPL-SCNC: 26 MMOL/L (ref 20–32)
CREAT SERPL-MCNC: 0.61 MG/DL (ref 0.52–1.04)
FASTING STATUS PATIENT QL REPORTED: YES
GFR SERPL CREATININE-BSD FRML MDRD: >90 ML/MIN/1.73M2
GLUCOSE BLD-MCNC: 106 MG/DL (ref 70–99)
HDLC SERPL-MCNC: 68 MG/DL
LDLC SERPL CALC-MCNC: 108 MG/DL
NONHDLC SERPL-MCNC: 131 MG/DL
POTASSIUM BLD-SCNC: 4.1 MMOL/L (ref 3.4–5.3)
SODIUM SERPL-SCNC: 139 MMOL/L (ref 133–144)
TRIGL SERPL-MCNC: 115 MG/DL
TSH SERPL DL<=0.005 MIU/L-ACNC: 3.28 MU/L (ref 0.4–4)

## 2022-09-02 PROCEDURE — 84460 ALANINE AMINO (ALT) (SGPT): CPT | Mod: ZL | Performed by: FAMILY MEDICINE

## 2022-09-02 PROCEDURE — 84443 ASSAY THYROID STIM HORMONE: CPT | Mod: ZL | Performed by: FAMILY MEDICINE

## 2022-09-02 PROCEDURE — G0463 HOSPITAL OUTPT CLINIC VISIT: HCPCS

## 2022-09-02 PROCEDURE — 80048 BASIC METABOLIC PNL TOTAL CA: CPT | Mod: ZL | Performed by: FAMILY MEDICINE

## 2022-09-02 PROCEDURE — 99214 OFFICE O/P EST MOD 30 MIN: CPT | Performed by: FAMILY MEDICINE

## 2022-09-02 PROCEDURE — 80061 LIPID PANEL: CPT | Mod: ZL | Performed by: FAMILY MEDICINE

## 2022-09-02 PROCEDURE — 36415 COLL VENOUS BLD VENIPUNCTURE: CPT | Mod: ZL | Performed by: FAMILY MEDICINE

## 2022-09-02 ASSESSMENT — ANXIETY QUESTIONNAIRES
5. BEING SO RESTLESS THAT IT IS HARD TO SIT STILL: SEVERAL DAYS
1. FEELING NERVOUS, ANXIOUS, OR ON EDGE: MORE THAN HALF THE DAYS
GAD7 TOTAL SCORE: 8
IF YOU CHECKED OFF ANY PROBLEMS ON THIS QUESTIONNAIRE, HOW DIFFICULT HAVE THESE PROBLEMS MADE IT FOR YOU TO DO YOUR WORK, TAKE CARE OF THINGS AT HOME, OR GET ALONG WITH OTHER PEOPLE: SOMEWHAT DIFFICULT
4. TROUBLE RELAXING: SEVERAL DAYS
7. FEELING AFRAID AS IF SOMETHING AWFUL MIGHT HAPPEN: SEVERAL DAYS
3. WORRYING TOO MUCH ABOUT DIFFERENT THINGS: SEVERAL DAYS
GAD7 TOTAL SCORE: 8
2. NOT BEING ABLE TO STOP OR CONTROL WORRYING: SEVERAL DAYS
6. BECOMING EASILY ANNOYED OR IRRITABLE: SEVERAL DAYS

## 2022-09-02 ASSESSMENT — PATIENT HEALTH QUESTIONNAIRE - PHQ9: SUM OF ALL RESPONSES TO PHQ QUESTIONS 1-9: 10

## 2022-09-02 ASSESSMENT — PAIN SCALES - GENERAL: PAINLEVEL: NO PAIN (0)

## 2022-09-02 NOTE — NURSING NOTE
"Chief Complaint   Patient presents with     Lipids     Hypertension     Thyroid Problem       Initial /78 (BP Location: Right arm, Patient Position: Chair, Cuff Size: Adult Regular)   Pulse 62   Temp 99.1  F (37.3  C) (Tympanic)   Resp 18   Wt 74.6 kg (164 lb 8 oz)   SpO2 96%   BMI 28.24 kg/m   Estimated body mass index is 28.24 kg/m  as calculated from the following:    Height as of 2/17/22: 1.626 m (5' 4\").    Weight as of this encounter: 74.6 kg (164 lb 8 oz).  Medication Reconciliation: complete  Juanita Garcia    "

## 2022-10-17 ENCOUNTER — TRANSFERRED RECORDS (OUTPATIENT)
Dept: HEALTH INFORMATION MANAGEMENT | Facility: CLINIC | Age: 67
End: 2022-10-17

## 2022-10-31 DIAGNOSIS — F41.9 ANXIETY: ICD-10-CM

## 2022-10-31 DIAGNOSIS — E03.9 HYPOTHYROIDISM, UNSPECIFIED TYPE: ICD-10-CM

## 2022-11-02 RX ORDER — BUPROPION HYDROCHLORIDE 150 MG/1
TABLET ORAL
Qty: 90 TABLET | Refills: 2 | Status: SHIPPED | OUTPATIENT
Start: 2022-11-02 | End: 2023-03-07

## 2022-11-02 RX ORDER — LEVOTHYROXINE SODIUM 50 UG/1
TABLET ORAL
Qty: 90 TABLET | Refills: 2 | Status: SHIPPED | OUTPATIENT
Start: 2022-11-02 | End: 2023-07-13

## 2022-11-07 DIAGNOSIS — E78.5 HYPERLIPIDEMIA, UNSPECIFIED HYPERLIPIDEMIA TYPE: ICD-10-CM

## 2022-11-08 RX ORDER — SIMVASTATIN 20 MG
TABLET ORAL
Qty: 90 TABLET | Refills: 1 | Status: SHIPPED | OUTPATIENT
Start: 2022-11-08 | End: 2023-04-26

## 2022-11-17 NOTE — PROGRESS NOTES
SUBJECTIVE:   Soraida Mclaughlin is a 62 year old female who presents to clinic today for the following health issues:      GERD/Heartburn  Onset: Between [place] and [place]    Description:     Burning in chest: YES    Intensity: moderate    Progression of Symptoms: improving    Accompanying Signs & Symptoms:  Does it feel like food gets stuck: YES- burning in throat  Nausea: no   Vomiting (bloody?): no   Abdominal Pain: YES- discomfort, upper stomach  Black-Tarry stools: no :  Bloody stools: no     History:   Previous ulcers: no     Precipitating factors:   Caffeine use: YES- 1-2 cups per day  Alcohol use: YES  NSAID/Aspirin use: YES- rarely  Tobacco use: no   Worse with fatty foods and alcohol.    Alleviating factors:  none    Therapies Tried and outcome:Prilosec - symptoms improving    Patient does have a history of LPR, diagnosed a few years ago.  She notes this feels similar.                   Problem list and histories reviewed & adjusted, as indicated.  Additional history: as documented    Patient Active Problem List   Diagnosis     Other specified cardiac dysrhythmias(427.89)     JAMIE (obstructive sleep apnea)     Deviated nasal septum     Nasal turbinate hypertrophy     Benign essential hypertension     Hyperlipidemia     Osteoarthrosis, unspecified whether generalized or localized, ankle and foot     Hypothyroidism     Esophageal reflux     Multinodular goiter     Advanced care planning/counseling discussion     Post-operative state     Past Surgical History:   Procedure Laterality Date     childbirth x 3       fusion 2nd & 3rd metatarsal      LT     hardware removal foot  2004    LT     HERNIA REPAIR       ORTHOPEDIC SURGERY       TONSILLECTOMY Bilateral 9/23/2014    Procedure: TONSILLECTOMY;  Surgeon: Caroline Gao MD;  Location: [facility]     TURBINOPLASTY Bilateral 9/23/2014    Procedure: TURBINOPLASTY;  Surgeon: Caroline Gao MD;  Location: [facility]     UVULOPALATOPHARYNGOPLASTY N/A  normal,  alert,  in no acute distress,  well developed, well nourished,  ambulating without difficulty,  normal communication ability "9/23/2014    Procedure: UVULOPALATOPHARYNGOPLASTY;  Surgeon: Caroline Gao MD;  Location: [facility]       Social History   Substance Use Topics     Smoking status: Former Smoker     Packs/day: 1.00     Years: 10.00     Types: Cigarettes     Smokeless tobacco: Never Used      Comment: quit in 1991.     Alcohol use Yes      Comment: socially 2 drinks/ twice a week     Family History   Problem Relation Age of Onset     CANCER Mother      pancreatic; cause of death     CEREBROVASCULAR DISEASE Father 66     cause of death     Other - See Comments Maternal Grandmother      goiter     Other - See Comments Sister      graves disease     Thyroid Disease Other      hyperthyroidism     Thyroid Disease Other      hypothyroidsim     DIABETES Other      HEART DISEASE Sister      myocardial infarction     Asthma No family hx of          Current Outpatient Prescriptions   Medication Sig Dispense Refill     omeprazole (PRILOSEC) 40 MG capsule Take 1 capsule (40 mg) by mouth daily 30 capsule 5     levothyroxine (SYNTHROID, LEVOTHROID) 50 MCG tablet Take 50 mcg by mouth daily        fish oil-omega-3 fatty acids (FISH OIL) 1000 MG capsule Take 2 g by mouth daily        cholecalciferol (VITAMIN D3) 1000 UNIT tablet Take 2 tablets by mouth daily        buPROPion (WELLBUTRIN XL) 150 MG 24 hr tablet Take 1 tablet by mouth daily.       simvastatin (ZOCOR) 10 MG tablet Take 1 tablet by mouth every evening.       Allergies   Allergen Reactions     Mold          Reviewed and updated as needed this visit by clinical staffTobacco  Allergies  Meds       Reviewed and updated as needed this visit by Provider         ROS:  Constitutional, HEENT, cardiovascular, pulmonary, gi and gu systems are negative, except as otherwise noted.      OBJECTIVE:   /78 (BP Location: Left arm, Patient Position: Sitting, Cuff Size: Adult Regular)  Pulse 88  Temp 98.1  F (36.7  C) (Tympanic)  Resp 14  Ht 5' 4\" (1.626 m)  Wt 164 lb (74.4 kg)  SpO2 96% "  BMI 28.15 kg/m2  Body mass index is 28.15 kg/(m^2).  GENERAL: healthy, alert and no distress  PSYCH: mentation appears normal, affect normal/bright    Diagnostic Test Results:  none     ASSESSMENT/PLAN:     1. Gastroesophageal reflux disease, esophagitis presence not specified  Labs ordered.  Will restart omeprazole at higher dose.  Follow-up with results when available.  - CBC with platelets  - Comprehensive metabolic panel  - H Pylori antigen, stool; Future  - omeprazole (PRILOSEC) 40 MG capsule; Take 1 capsule (40 mg) by mouth daily  Dispense: 30 capsule; Refill: 5    2. LPRD (laryngopharyngeal reflux disease)  As above.  - omeprazole (PRILOSEC) 40 MG capsule; Take 1 capsule (40 mg) by mouth daily  Dispense: 30 capsule; Refill: 5    Over 25 minutes are spent with the patient, over 50% of which was in education and counseling regarding current conditions and treatment/therapy options/risks/benefits/etc.      Berna Hou MD  [facility]

## 2022-11-20 ENCOUNTER — HEALTH MAINTENANCE LETTER (OUTPATIENT)
Age: 67
End: 2022-11-20

## 2023-03-07 ENCOUNTER — OFFICE VISIT (OUTPATIENT)
Dept: FAMILY MEDICINE | Facility: OTHER | Age: 68
End: 2023-03-07
Attending: FAMILY MEDICINE
Payer: COMMERCIAL

## 2023-03-07 VITALS
SYSTOLIC BLOOD PRESSURE: 138 MMHG | WEIGHT: 163.4 LBS | OXYGEN SATURATION: 98 % | HEART RATE: 88 BPM | TEMPERATURE: 98.7 F | BODY MASS INDEX: 28.05 KG/M2 | RESPIRATION RATE: 20 BRPM | DIASTOLIC BLOOD PRESSURE: 82 MMHG

## 2023-03-07 DIAGNOSIS — F41.9 ANXIETY: ICD-10-CM

## 2023-03-07 DIAGNOSIS — I47.10 PSVT (PAROXYSMAL SUPRAVENTRICULAR TACHYCARDIA) (H): ICD-10-CM

## 2023-03-07 DIAGNOSIS — I10 BENIGN ESSENTIAL HYPERTENSION: ICD-10-CM

## 2023-03-07 DIAGNOSIS — E03.9 HYPOTHYROIDISM, UNSPECIFIED TYPE: ICD-10-CM

## 2023-03-07 DIAGNOSIS — E78.5 HYPERLIPIDEMIA, UNSPECIFIED HYPERLIPIDEMIA TYPE: Primary | ICD-10-CM

## 2023-03-07 LAB
ALT SERPL W P-5'-P-CCNC: 27 U/L (ref 10–35)
ANION GAP SERPL CALCULATED.3IONS-SCNC: 13 MMOL/L (ref 7–15)
BUN SERPL-MCNC: 15.8 MG/DL (ref 8–23)
CALCIUM SERPL-MCNC: 9.9 MG/DL (ref 8.8–10.2)
CHLORIDE SERPL-SCNC: 104 MMOL/L (ref 98–107)
CHOLEST SERPL-MCNC: 198 MG/DL
CREAT SERPL-MCNC: 0.69 MG/DL (ref 0.51–0.95)
DEPRECATED HCO3 PLAS-SCNC: 25 MMOL/L (ref 22–29)
GFR SERPL CREATININE-BSD FRML MDRD: >90 ML/MIN/1.73M2
GLUCOSE SERPL-MCNC: 88 MG/DL (ref 70–99)
HDLC SERPL-MCNC: 65 MG/DL
HOLD SPECIMEN: NORMAL
LDLC SERPL CALC-MCNC: 118 MG/DL
NONHDLC SERPL-MCNC: 133 MG/DL
POTASSIUM SERPL-SCNC: 4.2 MMOL/L (ref 3.4–5.3)
SODIUM SERPL-SCNC: 142 MMOL/L (ref 136–145)
TRIGL SERPL-MCNC: 77 MG/DL
TSH SERPL DL<=0.005 MIU/L-ACNC: 3.21 UIU/ML (ref 0.3–4.2)

## 2023-03-07 PROCEDURE — 84460 ALANINE AMINO (ALT) (SGPT): CPT | Mod: ZL | Performed by: FAMILY MEDICINE

## 2023-03-07 PROCEDURE — 99214 OFFICE O/P EST MOD 30 MIN: CPT | Performed by: FAMILY MEDICINE

## 2023-03-07 PROCEDURE — 84443 ASSAY THYROID STIM HORMONE: CPT | Mod: ZL | Performed by: FAMILY MEDICINE

## 2023-03-07 PROCEDURE — 36415 COLL VENOUS BLD VENIPUNCTURE: CPT | Mod: ZL | Performed by: FAMILY MEDICINE

## 2023-03-07 PROCEDURE — 80048 BASIC METABOLIC PNL TOTAL CA: CPT | Mod: ZL | Performed by: FAMILY MEDICINE

## 2023-03-07 PROCEDURE — 80061 LIPID PANEL: CPT | Mod: ZL | Performed by: FAMILY MEDICINE

## 2023-03-07 PROCEDURE — G0463 HOSPITAL OUTPT CLINIC VISIT: HCPCS

## 2023-03-07 RX ORDER — BUPROPION HYDROCHLORIDE 300 MG/1
300 TABLET ORAL DAILY
Qty: 90 TABLET | Refills: 0 | Status: SHIPPED | OUTPATIENT
Start: 2023-03-07 | End: 2023-10-18

## 2023-03-07 ASSESSMENT — ANXIETY QUESTIONNAIRES
GAD7 TOTAL SCORE: 7
1. FEELING NERVOUS, ANXIOUS, OR ON EDGE: SEVERAL DAYS
3. WORRYING TOO MUCH ABOUT DIFFERENT THINGS: SEVERAL DAYS
GAD7 TOTAL SCORE: 7
4. TROUBLE RELAXING: SEVERAL DAYS
6. BECOMING EASILY ANNOYED OR IRRITABLE: SEVERAL DAYS
5. BEING SO RESTLESS THAT IT IS HARD TO SIT STILL: SEVERAL DAYS
7. FEELING AFRAID AS IF SOMETHING AWFUL MIGHT HAPPEN: SEVERAL DAYS
2. NOT BEING ABLE TO STOP OR CONTROL WORRYING: SEVERAL DAYS
IF YOU CHECKED OFF ANY PROBLEMS ON THIS QUESTIONNAIRE, HOW DIFFICULT HAVE THESE PROBLEMS MADE IT FOR YOU TO DO YOUR WORK, TAKE CARE OF THINGS AT HOME, OR GET ALONG WITH OTHER PEOPLE: SOMEWHAT DIFFICULT

## 2023-03-07 ASSESSMENT — PATIENT HEALTH QUESTIONNAIRE - PHQ9: SUM OF ALL RESPONSES TO PHQ QUESTIONS 1-9: 8

## 2023-03-07 ASSESSMENT — PAIN SCALES - GENERAL: PAINLEVEL: NO PAIN (0)

## 2023-03-07 NOTE — PROGRESS NOTES
Assessment & Plan     1. Hyperlipidemia, unspecified hyperlipidemia type  Labs updated, will adjust medication if needed.  Follow-up in six months, sooner as needed.  - Lipid Profile (Chol, Trig, HDL, LDL calc); Future  - ALT; Future  - Lipid Profile (Chol, Trig, HDL, LDL calc)  - ALT    2. Benign essential hypertension  As above.  - Basic metabolic panel; Future  - Basic metabolic panel    3. Anxiety  Will increase dose for the time being.  Follow-up if no improvement noted.  - buPROPion (WELLBUTRIN XL) 300 MG 24 hr tablet; Take 1 tablet (300 mg) by mouth daily  Dispense: 90 tablet; Refill: 0    4. Hypothyroidism, unspecified type  Labs updated  - TSH with free T4 reflex; Future  - TSH with free T4 reflex    5. PSVT (paroxysmal supraventricular tachycardia) (H)        Return in about 6 months (around 9/7/2023) for Chronic Disease Management, Medication review.    Berna Hou MD  [facility] - [facility] Calvin is a 67 year old, presenting for the following health issues:  RECHECK (HTN, hyperlipidemia )      HPI     Hyperlipidemia Follow-Up      Are you regularly taking any medication or supplement to lower your cholesterol?   Yes- simvastatin     Are you having muscle aches or other side effects that you think could be caused by your cholesterol lowering medication?  No    Hypertension Follow-up      Do you check your blood pressure regularly outside of the clinic? Yes     Are you following a low salt diet? Yes    Are your blood pressures ever more than 140 on the top number (systolic) OR more   than 90 on the bottom number (diastolic), for example 140/90? Yes- when under stress       Anxiety Follow-Up    How are you doing with your anxiety since your last visit? Worsened slightly    Are you having other symptoms that might be associated with anxiety? No    Have you had a significant life event? OTHER: family health concerns     Are you feeling depressed? No    Do you have any  concerns with your use of alcohol or other drugs? No    Social History     Tobacco Use     Smoking status: Former     Packs/day: 1.00     Years: 10.00     Pack years: 10.00     Types: Cigarettes     Quit date: 1991     Years since quittin.2     Smokeless tobacco: Never     Tobacco comments:     quit in .   Substance Use Topics     Alcohol use: Yes     Comment: socially 2 drinks/ twice a week     Drug use: No     Comment: quit 20 years ago     DEYANIRA-7 SCORE 2022 2022 3/7/2023   Total Score 7 8 7     PHQ 2022 2022 3/7/2023   PHQ-9 Total Score 4 10 8   Q9: Thoughts of better off dead/self-harm past 2 weeks Not at all Not at all Not at all         Hypothyroidism Follow-up      Since last visit, patient describes the following symptoms: Weight stable, no hair loss, no skin changes, no constipation, no loose stools      Review of Systems   Constitutional, HEENT, cardiovascular, pulmonary, gi and gu systems are negative, except as otherwise noted.      Objective    /82 (BP Location: Right arm, Patient Position: Sitting, Cuff Size: Adult Regular)   Pulse 88   Temp 98.7  F (37.1  C) (Tympanic)   Resp 20   Wt 74.1 kg (163 lb 6.4 oz)   SpO2 98%   BMI 28.05 kg/m    Body mass index is 28.05 kg/m .  Physical Exam   GENERAL: healthy, alert and no distress  PSYCH: mentation appears normal, affect normal/bright

## 2023-03-09 ENCOUNTER — OFFICE VISIT (OUTPATIENT)
Dept: FAMILY MEDICINE | Facility: OTHER | Age: 68
End: 2023-03-09
Attending: NURSE PRACTITIONER
Payer: COMMERCIAL

## 2023-03-09 VITALS
SYSTOLIC BLOOD PRESSURE: 138 MMHG | HEART RATE: 98 BPM | RESPIRATION RATE: 12 BRPM | TEMPERATURE: 99.7 F | DIASTOLIC BLOOD PRESSURE: 82 MMHG | OXYGEN SATURATION: 96 % | BODY MASS INDEX: 28.32 KG/M2 | WEIGHT: 165 LBS

## 2023-03-09 DIAGNOSIS — J39.2 LESION OF OROPHARYNX: Primary | ICD-10-CM

## 2023-03-09 LAB
BASOPHILS # BLD AUTO: 0 10E3/UL (ref 0–0.2)
BASOPHILS NFR BLD AUTO: 1 %
EOSINOPHIL # BLD AUTO: 0.2 10E3/UL (ref 0–0.7)
EOSINOPHIL NFR BLD AUTO: 4 %
ERYTHROCYTE [DISTWIDTH] IN BLOOD BY AUTOMATED COUNT: 13.4 % (ref 10–15)
HCT VFR BLD AUTO: 41.3 % (ref 35–47)
HGB BLD-MCNC: 14 G/DL (ref 11.7–15.7)
HOLD SPECIMEN: NORMAL
LYMPHOCYTES # BLD AUTO: 1.9 10E3/UL (ref 0.8–5.3)
LYMPHOCYTES NFR BLD AUTO: 31 %
MCH RBC QN AUTO: 30.8 PG (ref 26.5–33)
MCHC RBC AUTO-ENTMCNC: 33.9 G/DL (ref 31.5–36.5)
MCV RBC AUTO: 91 FL (ref 78–100)
MONOCYTES # BLD AUTO: 0.6 10E3/UL (ref 0–1.3)
MONOCYTES NFR BLD AUTO: 10 %
MONOCYTES NFR BLD AUTO: NEGATIVE %
NEUTROPHILS # BLD AUTO: 3.4 10E3/UL (ref 1.6–8.3)
NEUTROPHILS NFR BLD AUTO: 55 %
PLATELET # BLD AUTO: 280 10E3/UL (ref 150–450)
RBC # BLD AUTO: 4.55 10E6/UL (ref 3.8–5.2)
WBC # BLD AUTO: 6.2 10E3/UL (ref 4–11)

## 2023-03-09 PROCEDURE — G0463 HOSPITAL OUTPT CLINIC VISIT: HCPCS | Performed by: NURSE PRACTITIONER

## 2023-03-09 PROCEDURE — 86308 HETEROPHILE ANTIBODY SCREEN: CPT | Mod: ZL | Performed by: NURSE PRACTITIONER

## 2023-03-09 PROCEDURE — 36415 COLL VENOUS BLD VENIPUNCTURE: CPT | Mod: ZL | Performed by: NURSE PRACTITIONER

## 2023-03-09 PROCEDURE — 99213 OFFICE O/P EST LOW 20 MIN: CPT | Performed by: NURSE PRACTITIONER

## 2023-03-09 PROCEDURE — 85025 COMPLETE CBC W/AUTO DIFF WBC: CPT | Mod: ZL | Performed by: NURSE PRACTITIONER

## 2023-03-09 RX ORDER — CLOBETASOL PROPIONATE 0.5 MG/G
OINTMENT TOPICAL
COMMUNITY
Start: 2023-01-09

## 2023-03-09 ASSESSMENT — PAIN SCALES - GENERAL: PAINLEVEL: NO PAIN (0)

## 2023-03-09 NOTE — PROGRESS NOTES
Assessment & Plan     Lesion of oropharynx  No known risks for exposure to STI. Watches grand kids. No know strep or mononucleosis. I would like ENT to take a look at the posterior lesion to determine if there is any need for biopsy. 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 Okay to try salt water gargling between now and ENT appointment.   - Mononucleosis screen; Future  - CBC with platelets and differential; Future  - Mononucleosis screen  - CBC with platelets and differential      No follow-ups on file.    Lizy Estrada, CNP  [facility]   Marixa is a 67 year old presenting for the following health issues:  Other (Sores in mouth)      HPI     Concern - spots in mouth  Onset: noticed one about 3 weeks ago on the left. Then noticed one on the right. Thought it was from stress, perhaps biting her cheeks. However, now it is now on the back of the throat. No itching or burning.   Description: spots in mouth  Intensity: moderate  Progression of Symptoms:  Worsening  Therapies tried and outcome: colgate rinse - no improvement    No known recent illness. There was a cold that ran though the house 1 month ago. No strep like symptoms from family members.    No fevers, chills. No trouble swallowing. No SOB. No fatigue. Does have intermittent cough but rare. + postnasal drip intermittently. Hx chronic sinusitis. Takes zyrtec for this. Smoking hx: approximately 10 year intermittent history with highest level of intake at 1ppd for a short time.     Review of Systems   Constitutional, HEENT, cardiovascular, pulmonary, gi and gu systems are negative, except as otherwise noted.      Objective    /82 (BP Location: Right arm, Patient Position: Sitting, Cuff Size: Adult Regular)   Pulse 98   Temp 99.7  F (37.6  C) (Tympanic)   Resp 12   Wt 74.8 kg (165 lb)   SpO2 96%   BMI 28.32 kg/m    Body mass index is 28.32 kg/m .     Physical Exam    GENERAL: healthy, alert and no distress  EYES: Eyes grossly normal to inspection, PERRL and conjunctivae and sclerae normal  HENT: normal cephalic/atraumatic, oral mucous membranes moist. Patches with lacy white non-raised pattern to the to posterior buccal. Focal white patch to posterior pharynx. Reddened area to soft palate. Tonsils absent.    NECK: no adenopathy, no asymmetry, masses, or scars and thyroid normal to palpation  RESP: lungs clear to auscultation - no rales, rhonchi or wheezes  CV: regular rate and rhythm, normal S1 S2, no S3 or S4, no murmur, click or rub, no peripheral edema and peripheral pulses strong  MS: no gross musculoskeletal defects noted, no edema    Labs and/or imaging are pending at the end of this clinic visit. Please see communication attached to labs and/or imaging results.

## 2023-03-16 ENCOUNTER — TELEPHONE (OUTPATIENT)
Dept: FAMILY MEDICINE | Facility: OTHER | Age: 68
End: 2023-03-16

## 2023-03-16 NOTE — TELEPHONE ENCOUNTER
10:40 AM    Reason for Call: Phone Call    Description: pt needs a referral for ENT to another facility and talk to Dr. Hou about something personal. Please call pt    Was an appointment offered for this call? No  If yes : Appointment type              Date    Preferred method for responding to this message: Telephone Call  What is your phone number ? [phone]    If we cannot reach you directly, may we leave a detailed response at the number you provided? Yes    Can this message wait until your PCP/provider returns, if available today? YES    Giselle Johnson

## 2023-03-16 NOTE — TELEPHONE ENCOUNTER
Would like to go to [facility] for ENT.  She wanted you to review the notes and see if you agree with Sean's decision.

## 2023-03-17 NOTE — PROGRESS NOTES
Otolaryngology Consultation    Patient: Soraida Mclaughlin  : 1955    Chief Complaint   Patient presents with     Consult     Referred by Lizy Estrada for lesion of oropharynx       HPI:  Soraida Mclaughlin is a 67 year old female seen today for concerns of an oral lesion.   To be noticed concerns approximately 1 and half months ago she first noticed changes on her left than on her right.  Thought possibly from stress or biting her cheeks.  However she did feel it was present along the back of her throat.  No itching or burning.  She has tried [employer] rinse without improvement.  Mono and CBC were done which were normal.    Marixa noticed an area which appeared to be a canker sore and used peroxide. She has been under a great deal of stress, reports she was clenching. She noted her further back throat turned white. She feels like possible blisters.     No bleeding or drainage.   She reports mild irritation and aware with eating.   +She does clench  +Grinds at night, uses OTC dental guard at times. No recent use.     Patient denies sore throat or throat pain  Denies chronic otalgia.   Denies fevers, night sweats.   She has been on a diet and intentional weight loss.   Denies dysphagia or dysphonia.   Denies globus sensation.     Water- a few glasses.   Caffeine- 3-4 cups coffee  ETOH- few times a week.   Tobacco- Quit in . No further use.   Reflux- No recent concerns.       Intermittent post nasal drainage  AH may help.  Current Outpatient Rx   Medication Sig Dispense Refill     buPROPion (WELLBUTRIN XL) 300 MG 24 hr tablet Take 1 tablet (300 mg) by mouth daily 90 tablet 0     cetirizine HCl 10 MG CAPS        clobetasol (TEMOVATE) 0.05 % external ointment APPLY SMALL AMOUNT TOPICALLY TO THE AFFECTED AREA TWICE DAILY (Patient not taking: Reported on 3/9/2023)       fish oil-omega-3 fatty acids 1000 MG capsule Take 2 g by mouth daily        levothyroxine (SYNTHROID/LEVOTHROID) 50 MCG tablet TAKE 1 TABLET BY  MOUTH DAILY 90 tablet 2     LORazepam (ATIVAN) 0.5 MG tablet TAKE 1 TABLET BY MOUTH TWICE DAILY AS NEEDED       simvastatin (ZOCOR) 20 MG tablet TAKE 1 TABLET BY MOUTH AT BEDTIME 90 tablet 1     vitamin D3 (CHOLECALCIFEROL) 1000 units (25 mcg) tablet Take 2 tablets by mouth daily          Allergies: Mold and Seasonal allergies     Past Medical History:   Diagnosis Date     Allergic rhinitis 2012     DNS (deviated nasal septum) 2012     Dysphagia 2012     Esophageal reflux 2010     Hypertrophy of inferior nasal turbinate 2012     Multinodular goiter 2013     JAMIE (obstructive sleep apnea) 2012     Osteoarthrosis, unspecified whether generalized or localized, ankle and foot 2002     Other and unspecified hyperlipidemia 2010     Unspecified acquired hypothyroidism 2010     Unspecified essential hypertension 2010     Uvular hypertrophy 2012       Past Surgical History:   Procedure Laterality Date     childbirth x 3       COLONOSCOPY N/A 2020    Performed at [facility] by Dr. VALERY Padilla. Removed 1 hyperplastic polyp. F/U 10 years.      fusion 2nd & 3rd metatarsal      LT     GALLBLADDER SURGERY  2019     hardware removal foot  2004    LT     HERNIA REPAIR       ORTHOPEDIC SURGERY       TONSILLECTOMY Bilateral 2014    Procedure: TONSILLECTOMY;  Surgeon: Caroline Gao MD;  Location: [facility]     TURBINOPLASTY Bilateral 2014    Procedure: TURBINOPLASTY;  Surgeon: Caroline Gao MD;  Location: [facility]     UVULOPALATOPHARYNGOPLASTY N/A 2014    Procedure: UVULOPALATOPHARYNGOPLASTY;  Surgeon: Caroilne Gao MD;  Location: [facility]       ENT family history reviewed    Social History     Tobacco Use     Smoking status: Former     Packs/day: 1.00     Years: 10.00     Pack years: 10.00     Types: Cigarettes     Quit date: 1991     Years since quittin.2     Smokeless tobacco: Never     Tobacco comments:     quit in .  "  Substance Use Topics     Alcohol use: Yes     Comment: socially 2 drinks/ twice a week     Drug use: No     Comment: quit 20 years ago       Review of Systems  ROS: 10 point ROS neg other than the symptoms noted above in the HPI     Physical Exam  BP (!) 144/76 (Cuff Size: Adult Regular)   Pulse 102   Temp 98.6  F (37  C) (Tympanic)   Ht 1.626 m (5' 4\")   Wt 74.8 kg (165 lb)   SpO2 96%   BMI 28.32 kg/m    General - The patient is well nourished and well developed, and appears to have good nutritional status.  Alert and oriented to person and place, answers questions and cooperates with examination appropriately.   Head and Face - Normocephalic and atraumatic, with no gross asymmetry noted.  The facial nerve is intact, with strong symmetric movements.  Voice and Breathing - The patient was breathing comfortably without the use of accessory muscles. There was no wheezing, stridor, or stertor.  The patients voice was clear and strong, and had appropriate pitch and quality.  Ears -The external auditory canals are patent, the tympanic membranes are intact without effusion, retraction or mass.  Bony landmarks are intact.  Eyes - Extraocular movements intact, and the pupils were reactive to light.  Sclera were not icteric or injected, conjunctiva were pink and moist.  Mouth - Examination of the oral cavity showed pink, healthy oral mucosa.  Bilaterally along distal exam there is changes that are lacy white patches with erythema along margins.  Upon closer examination this appears bilaterally and does extend along buccal mucosa with white linear striations and erythema edematous margins.  The tongue was mobile and midline, and the dentition were in good condition.    Throat - The walls of the oropharynx were smooth, pink, moist, symmetric, and had no lesions or ulcerations.  The tonsillar pillars and soft palate were symmetric.  Tonsillar fossa clear surgical changes consistent with U PPP.  Small 2 mm appearance of " follicular cyst along left posterior pharynx.  Neck - Normal midline excursion of the laryngotracheal complex during swallowing.  Full range of motion on passive movement.  Palpation of the occipital, submental, submandibular, internal jugular chain, and supraclavicular nodes did not demonstrate any abnormal lymph nodes or masses.  Palpation of the thyroid was soft and smooth, with no nodules or goiter appreciated.  The trachea was mobile and midline.  Nose - External contour is symmetric, no gross deflection or scars.  Nasal mucosa is pink and moist with no abnormal mucus.      Flexible Endoscopy -     Attempts at mirror laryngoscopy were not possible due to gag reflex.  Therefore I proceeded with a fiberoptic examination.  First I sprayed both sides of the nose with a mixture of lidocaine and neosynephrine.  I then passed the scope through the nasal cavity.   The nasal cavity was unremarkable.  The nasopharynx was mucosally covered and symmetric.  The Eustachian tube openings were unobstructed.  Going further down I had a clear view of the base of tongue which had normal appearing lingual tonsillar tissue.  The base of tongue was free of lesions, and the vallecula was open.  The epiglottis was smooth and mucosally covered.  The supraglottic larynx was then clearly visualized.  The vocal cords moved smoothly and symmetrically, they were pearly white and no lesions were seen.  The pyriform sinuses were open, and the limited view of the postcricoid region did not show any lesions.    Procedure - Oral cavity lesion Removal    Informed consent was discussed and signed, and risks of the procedure were discussed, including bleeding, anesthesia, infection, scar formation, numbness, need for additional surgery, injury to salivary tissue.  I then infiltrated the mucosal tissues with 1% lidocaine with 1:200,000 epinephrine.  I then used a 4 mm punch biopsy to collect a specimen along the right lower buccal mucosa posterior  margin..  I then elevated the  ellipse and a thin cuff of underlying normal appearing mucosa with an iris scissor.  Hemostasis was achieved with direct pressure and silver nitrate cautery.    The specimen(s) was placed in formalin and sent to pathology.   The patient tolerated the procedure without any difficulty.      A/P -         ICD-10-CM    1. Oral lesion  K13.70 prednisoLONE (ORAPRED/PRELONE) 15 MG/5ML solution     Surgical Pathology Exam      2. Lichen planus  L43.9 prednisoLONE (ORAPRED/PRELONE) 15 MG/5ML solution          This patient has had biopsy of an oral cavity lesion today.  I have instructed the patient on wound care with 1/2 strength peroxide rinses for 1 week, and  Diet.  Ibuprofen alternated with tylenol prn pain.  The patient will be called with pathology results.  Findings appear consistent bilaterally suggestive of lichen planus.  Briefly discussed lichen planus with Marixa today.  Pathology report is pending to follow-up in 2 to 4 weeks.  If things progress or worsen symptoms may consider further biopsy or excision.       Education on oral lichen planus discussed with the patient.  We discussed that the pathogenesis of oral lichen planus is not completely understood, but may be an autoimmune mediated disease of the oral cavity, and she can expect flare ups but that food avoidance will help, as will control of gastroesophageal reflux disease.  Due to increased risk of squamous cell carcinoma in association with lichen planus, annual oral cavity exam should be performed by a dentist or dermatologist.     If lichen planus becomes erosive or symptomatic, topical or systemic steroids may be used      Nutrition  Foods to eat:    No foods are known to cause the condition. Good healthy diet and regular exercise is recommended.  Folic acid rich foods like green vegetables may prevent dry skin.  Diet rich in fruits and vegetables  Eat soft and bland food  Consume adequate water to maintain good  hydration.  Foods to avoid:    Avoid excess of salty and spicy foods  Avoid sour and acidic foods, vinegar, tamarind, refined flours.  Avoid crispy foods            Erlinda Smith PA-C  [facility], [place]

## 2023-03-20 ENCOUNTER — OFFICE VISIT (OUTPATIENT)
Dept: OTOLARYNGOLOGY | Facility: OTHER | Age: 68
End: 2023-03-20
Attending: PHYSICIAN ASSISTANT
Payer: MEDICARE

## 2023-03-20 VITALS
TEMPERATURE: 98.6 F | DIASTOLIC BLOOD PRESSURE: 76 MMHG | OXYGEN SATURATION: 96 % | HEIGHT: 64 IN | WEIGHT: 165 LBS | HEART RATE: 102 BPM | BODY MASS INDEX: 28.17 KG/M2 | SYSTOLIC BLOOD PRESSURE: 144 MMHG

## 2023-03-20 DIAGNOSIS — L43.9 LICHEN PLANUS: ICD-10-CM

## 2023-03-20 DIAGNOSIS — K13.70 ORAL LESION: Primary | ICD-10-CM

## 2023-03-20 PROCEDURE — 31575 DIAGNOSTIC LARYNGOSCOPY: CPT | Performed by: PHYSICIAN ASSISTANT

## 2023-03-20 PROCEDURE — 88312 SPECIAL STAINS GROUP 1: CPT | Mod: 26 | Performed by: PATHOLOGY

## 2023-03-20 PROCEDURE — 40808 BIOPSY OF MOUTH LESION: CPT | Performed by: PHYSICIAN ASSISTANT

## 2023-03-20 PROCEDURE — 88305 TISSUE EXAM BY PATHOLOGIST: CPT | Mod: TC | Performed by: PHYSICIAN ASSISTANT

## 2023-03-20 PROCEDURE — 88305 TISSUE EXAM BY PATHOLOGIST: CPT | Mod: 26 | Performed by: PATHOLOGY

## 2023-03-20 PROCEDURE — 99213 OFFICE O/P EST LOW 20 MIN: CPT | Mod: 25 | Performed by: PHYSICIAN ASSISTANT

## 2023-03-20 PROCEDURE — G0463 HOSPITAL OUTPT CLINIC VISIT: HCPCS | Mod: 25

## 2023-03-20 RX ORDER — PREDNISOLONE 15 MG/5 ML
SOLUTION, ORAL ORAL
Qty: 120 ML | Refills: 1 | Status: SHIPPED | OUTPATIENT
Start: 2023-03-20 | End: 2023-04-06

## 2023-03-20 ASSESSMENT — PAIN SCALES - GENERAL: PAINLEVEL: NO PAIN (0)

## 2023-03-20 NOTE — PATIENT INSTRUCTIONS
Small biopsy completed. Will call w/ pathology  Follow up in 3-4 weeks.   Follow post procedure instructions.   Start prednisolone mouthwash as directed.       Thank you for allowing Erlinda Smith PA-C and our ENT team to participate in your care.  If your medications are too expensive, please give the nurse a call.  We can possibly change this medication.  If you have a scheduling or an appointment question please contact our Health Unit Coordinator at [phone], Ext. [phone].    ALL nursing questions or concerns can be directed to your ENT nurse at: [phone] [facility]      Education on oral lichen planus discussed with the patient.  We discussed that the pathogenesis of oral lichen planus is not completely understood, but may be an autoimmune mediated disease of the oral cavity, and she can expect flare ups but that food avoidance will help, as will control of gastroesophageal reflux disease.  Due to increased risk of squamous cell carcinoma in association with lichen planus, annual oral cavity exam should be performed by a dentist or dermatologist.     If lichen planus becomes erosive or symptomatic, topical or systemic steroids may be used      Nutrition  Foods to eat:    No foods are known to cause the condition. Good healthy diet and regular exercise is recommended.  Folic acid rich foods like green vegetables may prevent dry skin.  Diet rich in fruits and vegetables  Eat soft and bland food  Consume adequate water to maintain good hydration.      Foods to avoid:  Avoid excess of salty and spicy foods  Avoid sour and acidic foods, vinegar, tamarind, refined flours.  Avoid crispy foods          POST PROCEDURE INSTRUCTIONS    For oral lesions, rinse your mouth with a mixture of half water and half hydrogen peroxide 3 times daily, after meals and before bed.     For lip lesions, apply Aquaphor Healing Ointment to the wound 3 times daily after cleaning with above mixture(Unless specified Bacitracin).    You can apply  ice to the surgical area to help reduce swelling. (no longer than 20 minutes at a time)     You can use acetaminophen(Tylenol) or the prescription you received for pain.     If you have sutures in place these are dissolvable. They will fall out on their own. DO NOT play with them as this will cause more irritation to the surgical area.     If cautery was used, that is the blackened area you see. This will fade away and can become a white patchy area. This is normal! Continue to clean wound as described above.     SIGNS OF INFECTION ARE:  Redness, swelling, red streaks, pus, drainage, warmth, fever, increased pain, foul smell.   Contact your primary health care provider if you notice any of the warning signs.     FOLLOW - UP  Follow-up as needed in clinic.   Pathology results will be called to you when they are back. This can take approx. 7-10 days.

## 2023-03-20 NOTE — LETTER
3/20/2023         RE: Soraida Mclaughlin  [address] [address]        Dear Colleague,    Thank you for referring your patient, Soraida Mclaughlin, to the [facility]. Please see a copy of my visit note below.    Otolaryngology Consultation    Patient: Soraida Mclaughlin  : 1955    Chief Complaint   Patient presents with     Consult     Referred by Lizy Estrada for lesion of oropharynx       HPI:  Soraida Mclaughlin is a 67 year old female seen today for concerns of an oral lesion.   To be noticed concerns approximately 1 and half months ago she first noticed changes on her left than on her right.  Thought possibly from stress or biting her cheeks.  However she did feel it was present along the back of her throat.  No itching or burning.  She has tried [employer] rinse without improvement.  Mono and CBC were done which were normal.    Marixa noticed an area which appeared to be a canker sore and used peroxide. She has been under a great deal of stress, reports she was clenching. She noted her further back throat turned white. She feels like possible blisters.     No bleeding or drainage.   She reports mild irritation and aware with eating.   +She does clench  +Grinds at night, uses OTC dental guard at times. No recent use.     Patient denies sore throat or throat pain  Denies chronic otalgia.   Denies fevers, night sweats.   She has been on a diet and intentional weight loss.   Denies dysphagia or dysphonia.   Denies globus sensation.     Water- a few glasses.   Caffeine- 3-4 cups coffee  ETOH- few times a week.   Tobacco- Quit in . No further use.   Reflux- No recent concerns.       Intermittent post nasal drainage  AH may help.  Current Outpatient Rx   Medication Sig Dispense Refill     buPROPion (WELLBUTRIN XL) 300 MG 24 hr tablet Take 1 tablet (300 mg) by mouth daily 90 tablet 0     cetirizine HCl 10 MG CAPS        clobetasol (TEMOVATE) 0.05 % external ointment  APPLY SMALL AMOUNT TOPICALLY TO THE AFFECTED AREA TWICE DAILY (Patient not taking: Reported on 3/9/2023)       fish oil-omega-3 fatty acids 1000 MG capsule Take 2 g by mouth daily        levothyroxine (SYNTHROID/LEVOTHROID) 50 MCG tablet TAKE 1 TABLET BY MOUTH DAILY 90 tablet 2     LORazepam (ATIVAN) 0.5 MG tablet TAKE 1 TABLET BY MOUTH TWICE DAILY AS NEEDED       simvastatin (ZOCOR) 20 MG tablet TAKE 1 TABLET BY MOUTH AT BEDTIME 90 tablet 1     vitamin D3 (CHOLECALCIFEROL) 1000 units (25 mcg) tablet Take 2 tablets by mouth daily          Allergies: Mold and Seasonal allergies     Past Medical History:   Diagnosis Date     Allergic rhinitis 9/6/2012     DNS (deviated nasal septum) 9/6/2012     Dysphagia 9/6/2012     Esophageal reflux 9/21/2010     Hypertrophy of inferior nasal turbinate 9/6/2012     Multinodular goiter 1/14/2013     JAMIE (obstructive sleep apnea) 9/6/2012     Osteoarthrosis, unspecified whether generalized or localized, ankle and foot 7/30/2002     Other and unspecified hyperlipidemia 9/21/2010     Unspecified acquired hypothyroidism 9/21/2010     Unspecified essential hypertension 9/21/2010     Uvular hypertrophy 9/6/2012       Past Surgical History:   Procedure Laterality Date     childbirth x 3       COLONOSCOPY N/A 01/16/2020    Performed at [facility] by Dr. VALERY Padilla. Removed 1 hyperplastic polyp. F/U 10 years.      fusion 2nd & 3rd metatarsal      LT     GALLBLADDER SURGERY  07/31/2019     hardware removal foot  2004    LT     HERNIA REPAIR       ORTHOPEDIC SURGERY       TONSILLECTOMY Bilateral 9/23/2014    Procedure: TONSILLECTOMY;  Surgeon: Caroline Gao MD;  Location: [facility]     TURBINOPLASTY Bilateral 9/23/2014    Procedure: TURBINOPLASTY;  Surgeon: Caroline Gao MD;  [facility]: [facility]     UVULOPALATOPHARYNGOPLASTY N/A 9/23/2014    Procedure: UVULOPALATOPHARYNGOPLASTY;  Surgeon: Caroline Gao MD;  Location: [facility]       ENT family history reviewed    Social History  "    Tobacco Use     Smoking status: Former     Packs/day: 1.00     Years: 10.00     Pack years: 10.00     Types: Cigarettes     Quit date: 1991     Years since quittin.2     Smokeless tobacco: Never     Tobacco comments:     quit in .   Substance Use Topics     Alcohol use: Yes     Comment: socially 2 drinks/ twice a week     Drug use: No     Comment: quit 20 years ago       Review of Systems  ROS: 10 point ROS neg other than the symptoms noted above in the HPI     Physical Exam  BP (!) 144/76 (Cuff Size: Adult Regular)   Pulse 102   Temp 98.6  F (37  C) (Tympanic)   Ht 1.626 m (5' 4\")   Wt 74.8 kg (165 lb)   SpO2 96%   BMI 28.32 kg/m    General - The patient is well nourished and well developed, and appears to have good nutritional status.  Alert and oriented to person and place, answers questions and cooperates with examination appropriately.   Head and Face - Normocephalic and atraumatic, with no gross asymmetry noted.  The facial nerve is intact, with strong symmetric movements.  Voice and Breathing - The patient was breathing comfortably without the use of accessory muscles. There was no wheezing, stridor, or stertor.  The patients voice was clear and strong, and had appropriate pitch and quality.  Ears -The external auditory canals are patent, the tympanic membranes are intact without effusion, retraction or mass.  Bony landmarks are intact.  Eyes - Extraocular movements intact, and the pupils were reactive to light.  Sclera were not icteric or injected, conjunctiva were pink and moist.  Mouth - Examination of the oral cavity showed pink, healthy oral mucosa.  Bilaterally along distal exam there is changes that are lacy white patches with erythema along margins.  Upon closer examination this appears bilaterally and does extend along buccal mucosa with white linear striations and erythema edematous margins.  The tongue was mobile and midline, and the dentition were in good condition.  "   Throat - The walls of the oropharynx were smooth, pink, moist, symmetric, and had no lesions or ulcerations.  The tonsillar pillars and soft palate were symmetric.  Tonsillar fossa clear surgical changes consistent with U PPP.  Small 2 mm appearance of follicular cyst along left posterior pharynx.  Neck - Normal midline excursion of the laryngotracheal complex during swallowing.  Full range of motion on passive movement.  Palpation of the occipital, submental, submandibular, internal jugular chain, and supraclavicular nodes did not demonstrate any abnormal lymph nodes or masses.  Palpation of the thyroid was soft and smooth, with no nodules or goiter appreciated.  The trachea was mobile and midline.  Nose - External contour is symmetric, no gross deflection or scars.  Nasal mucosa is pink and moist with no abnormal mucus.      Flexible Endoscopy -     Attempts at mirror laryngoscopy were not possible due to gag reflex.  Therefore I proceeded with a fiberoptic examination.  First I sprayed both sides of the nose with a mixture of lidocaine and neosynephrine.  I then passed the scope through the nasal cavity.   The nasal cavity was unremarkable.  The nasopharynx was mucosally covered and symmetric.  The Eustachian tube openings were unobstructed.  Going further down I had a clear view of the base of tongue which had normal appearing lingual tonsillar tissue.  The base of tongue was free of lesions, and the vallecula was open.  The epiglottis was smooth and mucosally covered.  The supraglottic larynx was then clearly visualized.  The vocal cords moved smoothly and symmetrically, they were pearly white and no lesions were seen.  The pyriform sinuses were open, and the limited view of the postcricoid region did not show any lesions.    Procedure - Oral cavity lesion Removal    Informed consent was discussed and signed, and risks of the procedure were discussed, including bleeding, anesthesia, infection, scar formation,  numbness, need for additional surgery, injury to salivary tissue.  I then infiltrated the mucosal tissues with 1% lidocaine with 1:200,000 epinephrine.  I then used a 4 mm punch biopsy to collect a specimen along the right lower buccal mucosa posterior margin..  I then elevated the  ellipse and a thin cuff of underlying normal appearing mucosa with an iris scissor.  Hemostasis was achieved with direct pressure and silver nitrate cautery.    The specimen(s) was placed in formalin and sent to pathology.   The patient tolerated the procedure without any difficulty.      A/P -         ICD-10-CM    1. Oral lesion  K13.70 prednisoLONE (ORAPRED/PRELONE) 15 MG/5ML solution     Surgical Pathology Exam      2. Lichen planus  L43.9 prednisoLONE (ORAPRED/PRELONE) 15 MG/5ML solution          This patient has had biopsy of an oral cavity lesion today.  I have instructed the patient on wound care with 1/2 strength peroxide rinses for 1 week, and  Diet.  Ibuprofen alternated with tylenol prn pain.  The patient will be called with pathology results.  Findings appear consistent bilaterally suggestive of lichen planus.  Briefly discussed lichen planus with Marixa today.  Pathology report is pending to follow-up in 2 to 4 weeks.  If things progress or worsen symptoms may consider further biopsy or excision.       Education on oral lichen planus discussed with the patient.  We discussed that the pathogenesis of oral lichen planus is not completely understood, but may be an autoimmune mediated disease of the oral cavity, and she can expect flare ups but that food avoidance will help, as will control of gastroesophageal reflux disease.  Due to increased risk of squamous cell carcinoma in association with lichen planus, annual oral cavity exam should be performed by a dentist or dermatologist.     If lichen planus becomes erosive or symptomatic, topical or systemic steroids may be used      Nutrition  Foods to eat:    No foods are known to  cause the condition. Good healthy diet and regular exercise is recommended.  Folic acid rich foods like green vegetables may prevent dry skin.  Diet rich in fruits and vegetables  Eat soft and bland food  Consume adequate water to maintain good hydration.  Foods to avoid:    Avoid excess of salty and spicy foods  Avoid sour and acidic foods, vinegar, tamarind, refined flours.  Avoid crispy foods            Erlinda Smith PA-C  [facility], [place]          Again, thank you for allowing me to participate in the care of your patient.        Sincerely,        Erlinda Smith PA-C

## 2023-03-21 NOTE — TELEPHONE ENCOUNTER
It looks like she saw [facility] last week and had biopsy, is she still looking for a different referral?

## 2023-03-22 LAB
PATH REPORT.COMMENTS IMP SPEC: NORMAL
PATH REPORT.FINAL DX SPEC: NORMAL
PATH REPORT.GROSS SPEC: NORMAL
PATH REPORT.MICROSCOPIC SPEC OTHER STN: NORMAL
PATH REPORT.RELEVANT HX SPEC: NORMAL
PHOTO IMAGE: NORMAL

## 2023-04-04 NOTE — PROGRESS NOTES
Chief Complaint   Patient presents with     RECHECK     Follow up oral cavity lesion removal 3/20/23       Marixa returns to ENT for follow-up exam.  Patient was last seen on 3/20/2023 for oral lesions at that time exam was consistent with lichen planus and biopsy completed confirmed erosive lichen planus on pathology report.  Patient did complete prednisolone with improvement.  She has felt overall less irritation and discomfort.    Final Diagnosis   A.  Oral cavity, right inner cheek lesion, biopsy:  -Squamous mucosa with features of erosive lichen planus.  -Special stain negative for fungal organisms.          No bleeding or drainage.   She reports mild irritation and aware with eating.   +She does clench  +Grinds at night, uses OTC dental guard at times. No recent use.      Patient denies sore throat or throat pain  Denies chronic otalgia.   Denies fevers, night sweats.   She has been on a diet and intentional weight loss.   Denies dysphagia or dysphonia.   Denies globus sensation.      Water- a few glasses.   Caffeine- 3-4 cups coffee  ETOH- few times a week.   Tobacco- Quit in 1991. No further use.   Reflux- No recent concerns.         Intermittent post nasal drainage  AH may help.    Past Medical History:   Diagnosis Date     Allergic rhinitis 9/6/2012     DNS (deviated nasal septum) 9/6/2012     Dysphagia 9/6/2012     Esophageal reflux 9/21/2010     Hypertrophy of inferior nasal turbinate 9/6/2012     Multinodular goiter 1/14/2013     JAMIE (obstructive sleep apnea) 9/6/2012     Osteoarthrosis, unspecified whether generalized or localized, ankle and foot 7/30/2002     Other and unspecified hyperlipidemia 9/21/2010     Unspecified acquired hypothyroidism 9/21/2010     Unspecified essential hypertension 9/21/2010     Uvular hypertrophy 9/6/2012        Allergies   Allergen Reactions     Mold      Seasonal Allergies      Current Outpatient Medications   Medication     buPROPion (WELLBUTRIN XL) 300 MG 24 hr tablet      cetirizine HCl 10 MG CAPS     clobetasol (TEMOVATE) 0.05 % external ointment     fish oil-omega-3 fatty acids 1000 MG capsule     levothyroxine (SYNTHROID/LEVOTHROID) 50 MCG tablet     LORazepam (ATIVAN) 0.5 MG tablet     prednisoLONE (ORAPRED/PRELONE) 15 MG/5ML solution     simvastatin (ZOCOR) 20 MG tablet     vitamin D3 (CHOLECALCIFEROL) 1000 units (25 mcg) tablet     No current facility-administered medications for this visit.     ROS- SEE HPI  BP (!) 142/86 (BP Location: Right arm, Patient Position: Sitting, Cuff Size: Adult Regular)   Pulse 88   Temp 98  F (36.7  C) (Tympanic)   Wt 72.6 kg (160 lb)   SpO2 98%   BMI 27.46 kg/m        General - The patient is well nourished and well developed, and appears to have good nutritional status.  Alert and oriented to person and place, answers questions and cooperates with examination appropriately.   Head and Face - Normocephalic and atraumatic, with no gross asymmetry noted.  The facial nerve is intact, with strong symmetric movements.  Voice and Breathing - The patient was breathing comfortably without the use of accessory muscles. There was no wheezing, stridor, or stertor.  The patients voice was clear and strong, and had appropriate pitch and quality.  Ears -The external auditory canals are patent, the tympanic membranes are intact without effusion, retraction or mass.  Bony landmarks are intact.  Eyes - Extraocular movements intact, and the pupils were reactive to light.  Sclera were not icteric or injected, conjunctiva were pink and moist.  Mouth - Examination of the oral cavity showed pink, healthy oral mucosa.  Bilaterally along distal exam there is changes that are lacy white patches with erythema along margins.  These bilateral areas have greatly reduced.  There is significantly less irritation upon exam.  Exam findings remain consistent with lichen planus but overall grossly resolved since her last visit. The tongue was mobile and midline, and the  dentition were in good condition.    Throat - The walls of the oropharynx were smooth, pink, moist, symmetric, and had no lesions or ulcerations.  The tonsillar pillars and soft palate were symmetric.  Tonsillar fossa clear surgical changes consistent with U PPP.  Small 2 mm appearance of follicular cyst along left posterior pharynx.  Neck - Normal midline excursion of the laryngotracheal complex during swallowing.  Full range of motion on passive movement.  Palpation of the occipital, submental, submandibular, internal jugular chain, and supraclavicular nodes did not demonstrate any abnormal lymph nodes or masses.  Palpation of the thyroid was soft and smooth, with no nodules or goiter appreciated.  The trachea was mobile and midline.  Nose - External contour is symmetric, no gross deflection or scars.  Nasal mucosa is pink and moist with no abnormal mucus.       ASSESSMENT/ PLAN:      ICD-10-CM    1. Lichen planus  L43.9 prednisoLONE (ORAPRED/PRELONE) 15 MG/5ML solution      2. Oral lesion  K13.70 prednisoLONE (ORAPRED/PRELONE) 15 MG/5ML solution            We will complete an additional prednisone rinse for the next 10 days.  Discussed fluid precautions and oral cares.    Return to ENT for annual exam.  Prednisone has refills placed and may use for as needed flares.  Marixa is happy with this plan and follow-up in 1 year.    Education on oral lichen planus discussed with the patient.  We discussed that the pathogenesis of oral lichen planus is not completely understood, but may be an autoimmune mediated disease of the oral cavity, and she can expect flare ups but that food avoidance will help, as will control of gastroesophageal reflux disease.  Due to increased risk of squamous cell carcinoma in association with lichen planus, annual oral cavity exam should be performed by a dentist or dermatologist.      If lichen planus becomes erosive or symptomatic, topical or systemic steroids may be  used     Nutrition  Foods to eat:     No foods are known to cause the condition. Good healthy diet and regular exercise is recommended.  Folic acid rich foods like green vegetables may prevent dry skin.  Diet rich in fruits and vegetables  Eat soft and bland food  Consume adequate water to maintain good hydration.    Foods to avoid:     Avoid excess of salty and spicy foods  Avoid sour and acidic foods, vinegar, tamarind, refined flours.  Avoid crispy foods       Erlinda Smith PA-C  [facility], [place]

## 2023-04-06 ENCOUNTER — OFFICE VISIT (OUTPATIENT)
Dept: OTOLARYNGOLOGY | Facility: OTHER | Age: 68
End: 2023-04-06
Attending: PHYSICIAN ASSISTANT
Payer: COMMERCIAL

## 2023-04-06 VITALS
DIASTOLIC BLOOD PRESSURE: 86 MMHG | TEMPERATURE: 98 F | SYSTOLIC BLOOD PRESSURE: 142 MMHG | BODY MASS INDEX: 27.46 KG/M2 | HEART RATE: 88 BPM | OXYGEN SATURATION: 98 % | WEIGHT: 160 LBS

## 2023-04-06 DIAGNOSIS — L43.9 LICHEN PLANUS: Primary | ICD-10-CM

## 2023-04-06 DIAGNOSIS — K13.70 ORAL LESION: ICD-10-CM

## 2023-04-06 PROCEDURE — 99213 OFFICE O/P EST LOW 20 MIN: CPT | Performed by: PHYSICIAN ASSISTANT

## 2023-04-06 PROCEDURE — G0463 HOSPITAL OUTPT CLINIC VISIT: HCPCS

## 2023-04-06 RX ORDER — PREDNISOLONE 15 MG/5 ML
SOLUTION, ORAL ORAL
Qty: 120 ML | Refills: 1 | Status: SHIPPED | OUTPATIENT
Start: 2023-04-06 | End: 2023-05-25

## 2023-04-06 ASSESSMENT — PAIN SCALES - GENERAL: PAINLEVEL: NO PAIN (0)

## 2023-04-06 NOTE — PATIENT INSTRUCTIONS
Mouth- overall greatly improved.   Complete Prednisolone for another 10 days.   Then hold. Restart for flares  Follow up in ENT in 1 year for oral exam.   Monitor for reflux and ensure controlled.       Thank you for allowing Erlinda Smith PA-C and our ENT team to participate in your care.  If your medications are too expensive, please give the nurse a call.  We can possibly change this medication.  If you have a scheduling or an appointment question please contact our Health Unit Coordinator at [phone], Ext. [phone].    ALL nursing questions or concerns can be directed to your ENT nurse at: [phone] [facility]  Foods to eat:     No foods are known to cause the condition. Good healthy diet and regular exercise is recommended.  Folic acid rich foods like green vegetables may prevent dry skin.  Diet rich in fruits and vegetables  Eat soft and bland food  Consume adequate water to maintain good hydration.    Foods to avoid:     Avoid excess of salty and spicy foods  Avoid sour and acidic foods, vinegar, tamarind, refined flours.  Avoid crispy foods

## 2023-04-06 NOTE — LETTER
4/6/2023         RE: Soraida Mclaughlin  [address] [address]        Dear Colleague,    Thank you for referring your patient, Soraida Mclaughlin, to the [facility]. Please see a copy of my visit note below.    Chief Complaint   Patient presents with     RECHECK     Follow up oral cavity lesion removal 3/20/23       Marixa returns to ENT for follow-up exam.  Patient was last seen on 3/20/2023 for oral lesions at that time exam was consistent with lichen planus and biopsy completed confirmed erosive lichen planus on pathology report.  Patient did complete prednisolone with improvement.  She has felt overall less irritation and discomfort.    Final Diagnosis   A.  Oral cavity, right inner cheek lesion, biopsy:  -Squamous mucosa with features of erosive lichen planus.  -Special stain negative for fungal organisms.          No bleeding or drainage.   She reports mild irritation and aware with eating.   +She does clench  +Grinds at night, uses OTC dental guard at times. No recent use.      Patient denies sore throat or throat pain  Denies chronic otalgia.   Denies fevers, night sweats.   She has been on a diet and intentional weight loss.   Denies dysphagia or dysphonia.   Denies globus sensation.      Water- a few glasses.   Caffeine- 3-4 cups coffee  ETOH- few times a week.   Tobacco- Quit in 1991. No further use.   Reflux- No recent concerns.         Intermittent post nasal drainage  AH may help.    Past Medical History:   Diagnosis Date     Allergic rhinitis 9/6/2012     DNS (deviated nasal septum) 9/6/2012     Dysphagia 9/6/2012     Esophageal reflux 9/21/2010     Hypertrophy of inferior nasal turbinate 9/6/2012     Multinodular goiter 1/14/2013     JAMIE (obstructive sleep apnea) 9/6/2012     Osteoarthrosis, unspecified whether generalized or localized, ankle and foot 7/30/2002     Other and unspecified hyperlipidemia 9/21/2010     Unspecified acquired hypothyroidism  9/21/2010     Unspecified essential hypertension 9/21/2010     Uvular hypertrophy 9/6/2012        Allergies   Allergen Reactions     Mold      Seasonal Allergies      Current Outpatient Medications   Medication     buPROPion (WELLBUTRIN XL) 300 MG 24 hr tablet     cetirizine HCl 10 MG CAPS     clobetasol (TEMOVATE) 0.05 % external ointment     fish oil-omega-3 fatty acids 1000 MG capsule     levothyroxine (SYNTHROID/LEVOTHROID) 50 MCG tablet     LORazepam (ATIVAN) 0.5 MG tablet     prednisoLONE (ORAPRED/PRELONE) 15 MG/5ML solution     simvastatin (ZOCOR) 20 MG tablet     vitamin D3 (CHOLECALCIFEROL) 1000 units (25 mcg) tablet     No current facility-administered medications for this visit.     ROS- SEE HPI  BP (!) 142/86 (BP Location: Right arm, Patient Position: Sitting, Cuff Size: Adult Regular)   Pulse 88   Temp 98  F (36.7  C) (Tympanic)   Wt 72.6 kg (160 lb)   SpO2 98%   BMI 27.46 kg/m        General - The patient is well nourished and well developed, and appears to have good nutritional status.  Alert and oriented to person and place, answers questions and cooperates with examination appropriately.   Head and Face - Normocephalic and atraumatic, with no gross asymmetry noted.  The facial nerve is intact, with strong symmetric movements.  Voice and Breathing - The patient was breathing comfortably without the use of accessory muscles. There was no wheezing, stridor, or stertor.  The patients voice was clear and strong, and had appropriate pitch and quality.  Ears -The external auditory canals are patent, the tympanic membranes are intact without effusion, retraction or mass.  Bony landmarks are intact.  Eyes - Extraocular movements intact, and the pupils were reactive to light.  Sclera were not icteric or injected, conjunctiva were pink and moist.  Mouth - Examination of the oral cavity showed pink, healthy oral mucosa.  Bilaterally along distal exam there is changes that are lacy white patches with  erythema along margins.  These bilateral areas have greatly reduced.  There is significantly less irritation upon exam.  Exam findings remain consistent with lichen planus but overall grossly resolved since her last visit. The tongue was mobile and midline, and the dentition were in good condition.    Throat - The walls of the oropharynx were smooth, pink, moist, symmetric, and had no lesions or ulcerations.  The tonsillar pillars and soft palate were symmetric.  Tonsillar fossa clear surgical changes consistent with U PPP.  Small 2 mm appearance of follicular cyst along left posterior pharynx.  Neck - Normal midline excursion of the laryngotracheal complex during swallowing.  Full range of motion on passive movement.  Palpation of the occipital, submental, submandibular, internal jugular chain, and supraclavicular nodes did not demonstrate any abnormal lymph nodes or masses.  Palpation of the thyroid was soft and smooth, with no nodules or goiter appreciated.  The trachea was mobile and midline.  Nose - External contour is symmetric, no gross deflection or scars.  Nasal mucosa is pink and moist with no abnormal mucus.       ASSESSMENT/ PLAN:      ICD-10-CM    1. Lichen planus  L43.9 prednisoLONE (ORAPRED/PRELONE) 15 MG/5ML solution      2. Oral lesion  K13.70 prednisoLONE (ORAPRED/PRELONE) 15 MG/5ML solution            We will complete an additional prednisone rinse for the next 10 days.  Discussed fluid precautions and oral cares.    Return to ENT for annual exam.  Prednisone has refills placed and may use for as needed flares.  Marixa is happy with this plan and follow-up in 1 year.    Education on oral lichen planus discussed with the patient.  We discussed that the pathogenesis of oral lichen planus is not completely understood, but may be an autoimmune mediated disease of the oral cavity, and she can expect flare ups but that food avoidance will help, as will control of gastroesophageal reflux disease.  Due to  increased risk of squamous cell carcinoma in association with lichen planus, annual oral cavity exam should be performed by a dentist or dermatologist.      If lichen planus becomes erosive or symptomatic, topical or systemic steroids may be used     Nutrition  Foods to eat:     No foods are known to cause the condition. Good healthy diet and regular exercise is recommended.  Folic acid rich foods like green vegetables may prevent dry skin.  Diet rich in fruits and vegetables  Eat soft and bland food  Consume adequate water to maintain good hydration.    Foods to avoid:     Avoid excess of salty and spicy foods  Avoid sour and acidic foods, vinegar, tamarind, refined flours.  Avoid crispy foods       Erlinda Smiht PA-C  ENT  [facility], [place]          Again, thank you for allowing me to participate in the care of your patient.        Sincerely,        Erlinda Smith PA-C

## 2023-05-25 ENCOUNTER — MYC MEDICAL ADVICE (OUTPATIENT)
Dept: OTOLARYNGOLOGY | Facility: OTHER | Age: 68
End: 2023-05-25

## 2023-05-25 DIAGNOSIS — K13.70 ORAL LESION: ICD-10-CM

## 2023-05-25 DIAGNOSIS — L43.9 LICHEN PLANUS: ICD-10-CM

## 2023-05-25 RX ORDER — PREDNISOLONE 15 MG/5 ML
SOLUTION, ORAL ORAL
Qty: 120 ML | Refills: 1 | Status: SHIPPED | OUTPATIENT
Start: 2023-05-25 | End: 2023-05-26

## 2023-05-25 NOTE — TELEPHONE ENCOUNTER
Prednisolone  Last Written Prescription Date: 4/6/23  Last Fill Quantity: 120 ml # of Refills: 1  Last Office Visit: 4/6/23

## 2023-05-26 ENCOUNTER — TRANSFERRED RECORDS (OUTPATIENT)
Dept: HEALTH INFORMATION MANAGEMENT | Facility: CLINIC | Age: 68
End: 2023-05-26

## 2023-05-26 RX ORDER — PREDNISOLONE 15 MG/5 ML
SOLUTION, ORAL ORAL
Qty: 120 ML | Refills: 1 | Status: SHIPPED | OUTPATIENT
Start: 2023-05-26 | End: 2023-12-21

## 2023-05-30 ENCOUNTER — MEDICAL CORRESPONDENCE (OUTPATIENT)
Dept: MRI IMAGING | Facility: HOSPITAL | Age: 68
End: 2023-05-30

## 2023-06-02 ENCOUNTER — HEALTH MAINTENANCE LETTER (OUTPATIENT)
Age: 68
End: 2023-06-02

## 2023-06-08 ENCOUNTER — HOSPITAL ENCOUNTER (OUTPATIENT)
Dept: MRI IMAGING | Facility: HOSPITAL | Age: 68
Discharge: HOME | End: 2023-06-08
Attending: ORTHOPAEDIC SURGERY | Admitting: ORTHOPAEDIC SURGERY
Payer: MEDICARE

## 2023-06-08 DIAGNOSIS — M25.511 RIGHT SHOULDER PAIN: ICD-10-CM

## 2023-06-08 PROCEDURE — 73221 MRI JOINT UPR EXTREM W/O DYE: CPT | Mod: RT

## 2023-07-11 DIAGNOSIS — E03.9 HYPOTHYROIDISM, UNSPECIFIED TYPE: ICD-10-CM

## 2023-07-11 DIAGNOSIS — F41.9 ANXIETY: ICD-10-CM

## 2023-07-13 RX ORDER — LEVOTHYROXINE SODIUM 50 UG/1
TABLET ORAL
Qty: 90 TABLET | Refills: 0 | Status: SHIPPED | OUTPATIENT
Start: 2023-07-13 | End: 2023-10-09

## 2023-07-13 RX ORDER — BUPROPION HYDROCHLORIDE 150 MG/1
TABLET ORAL
Qty: 90 TABLET | Refills: 2 | OUTPATIENT
Start: 2023-07-13

## 2023-07-13 NOTE — TELEPHONE ENCOUNTER
Wellbutrin      Last Written Prescription Date:  3/7/23  Last Fill Quantity: 90,   # refills: 0  Last Office Visit: 3/9/23  Future Office visit:    Next 5 appointments (look out 90 days)    Aug 25, 2023 10:00 AM  (Arrive by 9:45 AM)  Pre-Op physical with Berna Hou MD  [facility] [address]  [place] [address]  [phone]           Routing refill request to provider for review/approval because:    Levothyroxine      Last Written Prescription Date:  11/2/22  Last Fill Quantity: 90,   # refills: 2  Last Office Visit: 3/9/23  Future Office visit:    Next 5 appointments (look out 90 days)    Aug 25, 2023 10:00 AM  (Arrive by 9:45 AM)  Pre-Op physical with Berna Hou MD  [facility] ) [address]  [place] [address]  [phone]           Routing refill request to provider for review/approval because:

## 2023-07-14 ENCOUNTER — NURSE TRIAGE (OUTPATIENT)
Dept: FAMILY MEDICINE | Facility: OTHER | Age: 68
End: 2023-07-14

## 2023-07-14 NOTE — TELEPHONE ENCOUNTER
"Patient going to     Reason for Disposition    Patient wants to be seen    Answer Assessment - Initial Assessment Questions  1. LOCATION: \"Where does it hurt?\"       Left side of head back of the neck  Stiff neck  2. ONSET: \"When did the headache start?\" (Minutes, hours or days)       One week ago  3. PATTERN: \"Does the pain come and go, or has it been constant since it started?\"      Comes and goes  4. SEVERITY: \"How bad is the pain?\" and \"What does it keep you from doing?\"  (e.g., Scale 1-10; mild, moderate, or severe)    - MILD (1-3): doesn't interfere with normal activities     - MODERATE (4-7): interferes with normal activities or awakens from sleep     - SEVERE (8-10): excruciating pain, unable to do any normal activities         5/10 when it is bad  5. RECURRENT SYMPTOM: \"Have you ever had headaches before?\" If Yes, ask: \"When was the last time?\" and \"What happened that time?\"       no  6. CAUSE: \"What do you think is causing the headache?\"      Patient is should surgery on the right side in September 7. MIGRAINE: \"Have you been diagnosed with migraine headaches?\" If Yes, ask: \"Is this headache similar?\"       no  8. HEAD INJURY: \"Has there been any recent injury to the head?\"       no  9. OTHER SYMPTOMS: \"Do you have any other symptoms?\" (fever, stiff neck, eye pain, sore throat, cold symptoms)      stiff neck and it clicks a lot.  Patient has a sore throat on the left side of her throat, also has sores in her mouth   10. PREGNANCY: \"Is there any chance you are pregnant?\" \"When was your last menstrual period?\"        no    Protocols used: HEADACHE-A-OH      "

## 2023-07-17 ENCOUNTER — OFFICE VISIT (OUTPATIENT)
Dept: FAMILY MEDICINE | Facility: OTHER | Age: 68
End: 2023-07-17
Attending: NURSE PRACTITIONER
Payer: COMMERCIAL

## 2023-07-17 ENCOUNTER — ANCILLARY PROCEDURE (OUTPATIENT)
Dept: GENERAL RADIOLOGY | Facility: OTHER | Age: 68
End: 2023-07-17
Attending: NURSE PRACTITIONER
Payer: MEDICARE

## 2023-07-17 VITALS
OXYGEN SATURATION: 96 % | HEIGHT: 64 IN | BODY MASS INDEX: 27.21 KG/M2 | HEART RATE: 96 BPM | SYSTOLIC BLOOD PRESSURE: 152 MMHG | RESPIRATION RATE: 16 BRPM | TEMPERATURE: 98 F | WEIGHT: 159.4 LBS | DIASTOLIC BLOOD PRESSURE: 96 MMHG

## 2023-07-17 DIAGNOSIS — M54.2 CERVICALGIA: Primary | ICD-10-CM

## 2023-07-17 DIAGNOSIS — M62.838 CERVICAL PARASPINAL MUSCLE SPASM: ICD-10-CM

## 2023-07-17 DIAGNOSIS — M54.2 CERVICALGIA: ICD-10-CM

## 2023-07-17 DIAGNOSIS — G44.209 TENSION HEADACHE: ICD-10-CM

## 2023-07-17 PROCEDURE — G0463 HOSPITAL OUTPT CLINIC VISIT: HCPCS

## 2023-07-17 PROCEDURE — 99214 OFFICE O/P EST MOD 30 MIN: CPT | Performed by: NURSE PRACTITIONER

## 2023-07-17 PROCEDURE — 72050 X-RAY EXAM NECK SPINE 4/5VWS: CPT | Mod: TC,FY

## 2023-07-17 RX ORDER — CYCLOBENZAPRINE HCL 10 MG
10 TABLET ORAL 3 TIMES DAILY PRN
Qty: 60 TABLET | Refills: 3 | Status: SHIPPED | OUTPATIENT
Start: 2023-07-17

## 2023-07-17 RX ORDER — IBUPROFEN 600 MG/1
600 TABLET, FILM COATED ORAL 3 TIMES DAILY PRN
Qty: 90 TABLET | Refills: 1 | Status: SHIPPED | OUTPATIENT
Start: 2023-07-17

## 2023-07-17 ASSESSMENT — PAIN SCALES - GENERAL: PAINLEVEL: MODERATE PAIN (5)

## 2023-07-17 NOTE — PROGRESS NOTES
"    Assessment & Plan     1. Cervicalgia  Arthritis of cervical spine  - MR Cervical Spine w/o Contrast; Future  - cyclobenzaprine (FLEXERIL) 10 MG tablet; Take 1 tablet (10 mg) by mouth 3 times daily as needed for muscle spasms  Dispense: 60 tablet; Refill: 3  - ibuprofen (ADVIL/MOTRIN) 600 MG tablet; Take 1 tablet (600 mg) by mouth 3 times daily as needed for moderate pain  Dispense: 90 tablet; Refill: 1    2. Cervical paraspinal muscle spasm  - cyclobenzaprine (FLEXERIL) 10 MG tablet; Take 1 tablet (10 mg) by mouth 3 times daily as needed for muscle spasms  Dispense: 60 tablet; Refill: 3    3. Tension headache  - cyclobenzaprine (FLEXERIL) 10 MG tablet; Take 1 tablet (10 mg) by mouth 3 times daily as needed for muscle spasms  Dispense: 60 tablet; Refill: 3  - ibuprofen (ADVIL/MOTRIN) 600 MG tablet; Take 1 tablet (600 mg) by mouth 3 times daily as needed for moderate pain  Dispense: 90 tablet; Refill: 1      Do not use extra ibuprofen, can add tylenol  Ice, heat as needed          BMI:   Estimated body mass index is 27.36 kg/m  as calculated from the following:    Height as of this encounter: 1.626 m (5' 4\").    Weight as of this encounter: 72.3 kg (159 lb 6.4 oz).   Follow-up as needed.  Will consider neck injections after right shoulder surgery.      Manuela Ordaz NP  [facility] is a 67 year old, presenting for the following health issues:  Neck Pain          HPI     Pain History:  When did you first notice your pain? About a week    Have you seen anyone else for your pain? No  How has your pain affected your ability to work? Not applicable  Where in your body do you have pain? Neck Pain  Onset/Duration: about a week   Description:   Location: base of neck   Radiation: none and back of head   Intensity: moderate  Progression of Symptoms:  same  Accompanying Signs & Symptoms:  Burning, tingling, prickly sensation in arm(s): No  Numbness in arm(s): YES- but " "schedule for shoulder surgery   Weakness in arm(s):  No  Fever: No  Headache: YES- back of head   Nausea and/or vomiting: No  History:   Trauma: No  Previous neck pain: YES- awhile ago off and on   Previous surgery or injections: No  Previous Imaging (MRI,X ray): YES- about 10-15 years ago   Precipitating or alleviating factors: None  Does movement impact the pain:  YES  Therapies tried and outcome: acetaminophen and Ibuprofen          Recent Labs   Lab Test 03/07/23  1121 09/02/22  1053 02/17/22  1040 06/10/21  1602 11/19/20  1215 08/08/19  0931   * 108* 94  --  111* 121*   HDL 65 68 65  --  72 45*   TRIG 77 115 71  --  146 128   ALT 27 37 37  --  25 71*   CR 0.69 0.61 0.65   < > 0.66 0.67   GFRESTIMATED >90 >90 >90   < > >90 >90   GFRESTBLACK  --   --   --   --  >90 >90   POTASSIUM 4.2 4.1 4.2   < > 3.8 4.1   TSH 3.21 3.28 2.70   < > 1.91 2.77    < > = values in this interval not displayed.      BP Readings from Last 3 Encounters:   07/17/23 (!) 152/96   04/06/23 (!) 142/86   03/20/23 (!) 144/76    Wt Readings from Last 3 Encounters:   07/17/23 72.3 kg (159 lb 6.4 oz)   04/06/23 72.6 kg (160 lb)   03/20/23 74.8 kg (165 lb)                 Review of Systems   Constitutional, HEENT, cardiovascular, pulmonary, gi and gu systems are negative, except as otherwise noted.      Objective    BP (!) 152/96 (BP Location: Left arm, Patient Position: Chair, Cuff Size: Adult Regular)   Pulse 96   Temp 98  F (36.7  C) (Tympanic)   Resp 16   Ht 1.626 m (5' 4\")   Wt 72.3 kg (159 lb 6.4 oz)   SpO2 96%   BMI 27.36 kg/m    Body mass index is 27.36 kg/m .  Physical Exam   GENERAL: healthy, alert and no distress  MS: ROM of neck intact flexion and extension.  Decreased side to side, left more limited than right.    PSYCH: mentation appears normal, affect normal/bright        Results for orders placed or performed in visit on 07/17/23   XR Cervical Spine G/E 4 Views     Status: None    Narrative    PROCEDURE: XR CERVICAL " SPINE G/E 4 VIEWS 7/17/2023 10:45 AM    HISTORY: Cervicalgia    COMPARISONS: None.    TECHNIQUE: 5 views    FINDINGS: Degenerative changes are seen at the middle atlantoaxial  joint. Anterior osteophytes are seen at C2-C3. C3-C4 disc is normal.  There are is decrease in disc space height noted with anterior and  posterior osteophytes and uncovertebral joint spurs at L4 C5, C5-C6  and C6-C7. The C7-T1 disc is normal in height. Moderate cervical facet  joint degenerative changes are noted. The prevertebral soft tissues  are normal.         Impression    IMPRESSION: Moderate degenerative changes of the cervical spine    LARS NATION MD         SYSTEM ID:  [id number]

## 2023-07-17 NOTE — PATIENT INSTRUCTIONS
"Assessment & Plan     1. Cervicalgia  Arthritis of cervical spine  - MR Cervical Spine w/o Contrast; Future  - cyclobenzaprine (FLEXERIL) 10 MG tablet; Take 1 tablet (10 mg) by mouth 3 times daily as needed for muscle spasms  Dispense: 60 tablet; Refill: 3  - ibuprofen (ADVIL/MOTRIN) 600 MG tablet; Take 1 tablet (600 mg) by mouth 3 times daily as needed for moderate pain  Dispense: 90 tablet; Refill: 1    2. Cervical paraspinal muscle spasm  - cyclobenzaprine (FLEXERIL) 10 MG tablet; Take 1 tablet (10 mg) by mouth 3 times daily as needed for muscle spasms  Dispense: 60 tablet; Refill: 3    3. Tension headache  - cyclobenzaprine (FLEXERIL) 10 MG tablet; Take 1 tablet (10 mg) by mouth 3 times daily as needed for muscle spasms  Dispense: 60 tablet; Refill: 3  - ibuprofen (ADVIL/MOTRIN) 600 MG tablet; Take 1 tablet (600 mg) by mouth 3 times daily as needed for moderate pain  Dispense: 90 tablet; Refill: 1      Do not use extra ibuprofen, can add tylenol  Ice, heat as needed          BMI:   Estimated body mass index is 27.36 kg/m  as calculated from the following:    Height as of this encounter: 1.626 m (5' 4\").    Weight as of this encounter: 72.3 kg (159 lb 6.4 oz).   Follow-up as needed.  Will consider neck injections after right shoulder surgery.      Manuela Ordaz NP  [facility]  "

## 2023-08-03 ENCOUNTER — HOSPITAL ENCOUNTER (OUTPATIENT)
Dept: MRI IMAGING | Facility: HOSPITAL | Age: 68
Discharge: HOME | End: 2023-08-03
Attending: NURSE PRACTITIONER | Admitting: NURSE PRACTITIONER
Payer: MEDICARE

## 2023-08-03 DIAGNOSIS — M54.2 CERVICALGIA: ICD-10-CM

## 2023-08-03 PROCEDURE — G1010 CDSM STANSON: HCPCS

## 2023-08-18 NOTE — PROGRESS NOTES
[facility]  [address]  [address]  [place] [address]  Phone: [phone]  Primary Provider: Berna Hou  Pre-op Performing Provider: BERNA HOU      PREOPERATIVE EVALUATION:  Today's date: 8/25/2023    Soraida Mclaughlin is a 67 year old female who presents for a preoperative evaluation.      8/25/2023    10:05 AM   Additional Questions   Roomed by neda   Accompanied by none       Surgical Information:  Surgery/Procedure: Reverse Total Right Shoulder Replacement  Surgery Location: [place], [place]  Surgeon: Dr. Reggie Mckinnon  Surgery Date: 09/06/23  Time of Surgery: TBD  Where patient plans to recover: At home with family  Fax number for surgical facility:     Assessment & Plan     The proposed surgical procedure is considered INTERMEDIATE risk.      ICD-10-CM    1. Pre-operative general physical examination  Z01.818 CBC with platelets     Basic metabolic panel     EKG 12-lead complete w/read - (Clinic Performed)     CBC with platelets     Basic metabolic panel      2. Arthritis of right shoulder region  M19.011       3. Anxiety  F41.9       4. Hyperlipidemia, unspecified hyperlipidemia type  E78.5       5. Benign essential hypertension  I10 Basic metabolic panel     Basic metabolic panel      6. Hypothyroidism, unspecified type  E03.9                    - No identified additional risk factors other than previously addressed    Antiplatelet or Anticoagulation Medication Instructions:  Hold all ASA/NSAIDs/Vitamins/Supplements 7-10 days prior to procedure     Additional Medication Instructions:   - SSRIs, SNRIs, TCAs, Antipsychotics: Continue without modification.     RECOMMENDATION:  APPROVAL GIVEN to proceed with proposed procedure, without further diagnostic evaluation.      Subjective       HPI related to upcoming procedure: Activity limiting arthritis of right shoulder          8/24/2023    11:24 PM   Preop Questions   1. Have you ever had a heart attack  or stroke? No   2. Have you ever had surgery on your heart or blood vessels, such as a stent placement, a coronary artery bypass, or surgery on an artery in your head, neck, heart, or legs? No   3. Do you have chest pain with activity? No   4. Do you have a history of  heart failure? No   5. Do you currently have a cold, bronchitis or symptoms of other infection? No   6. Do you have a cough, shortness of breath, or wheezing? No   7. Do you or anyone in your family have previous history of blood clots? No   8. Do you or does anyone in your family have a serious bleeding problem such as prolonged bleeding following surgeries or cuts? No   9. Have you ever had problems with anemia or been told to take iron pills? No   10. Have you had any abnormal blood loss such as black, tarry or bloody stools, or abnormal vaginal bleeding? No   11. Have you ever had a blood transfusion? YES - after accident and surgery   11a. Have you ever had a transfusion reaction? No   12. Are you willing to have a blood transfusion if it is medically needed before, during, or after your surgery? Yes   13. Have you or any of your relatives ever had problems with anesthesia? No   14. Do you have sleep apnea, excessive snoring or daytime drowsiness? No   15. Do you have any artifical heart valves or other implanted medical devices like a pacemaker, defibrillator, or continuous glucose monitor? No   16. Do you have artificial joints? YES - Left knee   17. Are you allergic to latex? No     Health Care Directive:  Patient does not have a Health Care Directive or Living Will: Discussed advance care planning with patient; however, patient declined at this time.    Preoperative Review of :   reviewed - no record of controlled substances prescribed.      Status of Chronic Conditions:  HYPERLIPIDEMIA - Patient has a long history of significant Hyperlipidemia requiring medication for treatment with recent good control. Patient reports no problems or  side effects with the medication.     HYPERTENSION - Patient has longstanding history of HTN , currently denies any symptoms referable to elevated blood pressure. Specifically denies chest pain, palpitations, dyspnea, orthopnea, PND or peripheral edema. Blood pressure readings have been in normal range. Current medication regimen is as listed below. Patient denies any side effects of medication.     HYPOTHYROIDISM - Patient has a longstanding history of chronic Hypothyroidism. Patient has been doing well, noting no tremor, insomnia, hair loss or changes in skin texture. Continues to take medications as directed, without adverse reactions or side effects. Last TSH   Lab Results   Component Value Date    TSH 3.21 03/07/2023   .      Review of Systems  Constitutional, neuro, ENT, endocrine, pulmonary, cardiac, gastrointestinal, genitourinary, musculoskeletal, integument and psychiatric systems are negative, except as otherwise noted.    Patient Active Problem List    Diagnosis Date Noted    Anxiety 06/10/2021     Priority: Medium    Palpitations 04/19/2019     Priority: Medium    PSVT (paroxysmal supraventricular tachycardia) (H) 04/19/2019     Priority: Medium    History of tobacco abuse quitting on 1/1/91 04/19/2019     Priority: Medium    On statin therapy 04/19/2019     Priority: Medium    Post-operative state 09/23/2014     Priority: Medium    JAMIE without CPAP 05/23/2013     Priority: Medium    Deviated nasal septum 05/23/2013     Priority: Medium    Nasal turbinate hypertrophy 05/23/2013     Priority: Medium    Other specified cardiac dysrhythmias(427.89) 05/06/2013     Priority: Medium    Multinodular goiter 01/14/2013     Priority: Medium    Advanced care planning/counseling discussion 03/27/2012     Priority: Medium    Benign essential hypertension 09/21/2010     Priority: Medium    Hyperlipidemia, unspecified hyperlipidemia type 09/21/2010     Priority: Medium    Hypothyroidism 09/21/2010     Priority: Medium     GERD 09/21/2010     Priority: Medium    Osteoarthrosis, unspecified whether generalized or localized, ankle and foot 07/30/2002     Priority: Medium      Past Medical History:   Diagnosis Date    Allergic rhinitis 9/6/2012    DNS (deviated nasal septum) 9/6/2012    Dysphagia 9/6/2012    Esophageal reflux 9/21/2010    Hypertrophy of inferior nasal turbinate 9/6/2012    Multinodular goiter 1/14/2013    JAMIE (obstructive sleep apnea) 9/6/2012    Osteoarthrosis, unspecified whether generalized or localized, ankle and foot 7/30/2002    Other and unspecified hyperlipidemia 9/21/2010    Unspecified acquired hypothyroidism 9/21/2010    Unspecified essential hypertension 9/21/2010    Uvular hypertrophy 9/6/2012     Past Surgical History:   Procedure Laterality Date    AS TOTAL KNEE ARTHROPLASTY Left 2013    childbirth x 3      COLONOSCOPY N/A 01/16/2020    Performed at [facility] by Dr. VALERY Padilla. Removed 1 hyperplastic polyp. F/U 10 years.     fusion 2nd & 3rd metatarsal      LT    GALLBLADDER SURGERY  07/31/2019    hardware removal foot  2004    LT    HERNIA REPAIR      TONSILLECTOMY Bilateral 09/23/2014    Procedure: TONSILLECTOMY;  Surgeon: Caroline Gao MD;  Location: [facility] OR    TURBINOPLASTY Bilateral 09/23/2014    Procedure: TURBINOPLASTY;  Surgeon: Caroline Gao MD;  Location: [facility]    UVULOPALATOPHARYNGOPLASTY N/A 09/23/2014    Procedure: UVULOPALATOPHARYNGOPLASTY;  Surgeon: Caroline Gao MD;  Location: [facility]     Current Outpatient Medications   Medication Sig Dispense Refill    buPROPion (WELLBUTRIN XL) 300 MG 24 hr tablet Take 1 tablet (300 mg) by mouth daily 90 tablet 0    cetirizine HCl 10 MG CAPS       clobetasol (TEMOVATE) 0.05 % external ointment       cyclobenzaprine (FLEXERIL) 10 MG tablet Take 1 tablet (10 mg) by mouth 3 times daily as needed for muscle spasms 60 tablet 3    fish oil-omega-3 fatty acids 1000 MG capsule Take 2 g by mouth daily       ibuprofen (ADVIL/MOTRIN) 600  "MG tablet Take 1 tablet (600 mg) by mouth 3 times daily as needed for moderate pain 90 tablet 1    levothyroxine (SYNTHROID/LEVOTHROID) 50 MCG tablet TAKE 1 TABLET BY MOUTH DAILY 90 tablet 0    LORazepam (ATIVAN) 0.5 MG tablet TAKE 1 TABLET BY MOUTH TWICE DAILY AS NEEDED      prednisoLONE (ORAPRED/PRELONE) 15 MG/5ML solution Swish in mouth for 30 seconds and then spit, 5 ml 2 times daily for 10 days. Do not eat or drink after for 15 minutes 120 mL 1    simvastatin (ZOCOR) 20 MG tablet TAKE 1 TABLET BY MOUTH AT BEDTIME 90 tablet 1    vitamin D3 (CHOLECALCIFEROL) 1000 units (25 mcg) tablet Take 2 tablets by mouth daily          Allergies   Allergen Reactions    Mold     Seasonal Allergies         Social History     Tobacco Use    Smoking status: Former     Packs/day: 1.00     Years: 10.00     Pack years: 10.00     Types: Cigarettes     Quit date: 1991     Years since quittin.6    Smokeless tobacco: Never    Tobacco comments:     quit in .   Substance Use Topics    Alcohol use: Yes     Comment: socially 2 drinks/ twice a week     Family History   Problem Relation Age of Onset    Cancer Mother         pancreatic; cause of death    Cerebrovascular Disease Father 66        cause of death    Other - See Comments Maternal Grandmother         goiter    Other - See Comments Sister         graves disease    Thyroid Disease Other         hyperthyroidism    Thyroid Disease Other         hypothyroidsim    Diabetes Other     Heart Disease Sister         myocardial infarction    Asthma No family hx of      History   Drug Use No     Comment: quit 20 years ago         Objective     /80 (BP Location: Left arm, Patient Position: Chair, Cuff Size: Adult Large)   Pulse 119   Temp 99  F (37.2  C) (Tympanic)   Resp 16   Ht 1.626 m (5' 4\")   Wt 71.2 kg (156 lb 14.4 oz)   SpO2 97%   BMI 26.93 kg/m      Physical Exam    GENERAL APPEARANCE: healthy, alert and no distress     EYES: EOMI, PERRL     HENT: ear canals and " TM's normal and nose and mouth without ulcers or lesions     NECK: no adenopathy     RESP: lungs clear to auscultation - no rales, rhonchi or wheezes     CV: regular rates and rhythm and no murmur, click or rub     SKIN: no suspicious lesions or rashes     NEURO: Normal strength and tone, sensory exam grossly normal, mentation intact and speech normal     PSYCH: mentation appears normal. and affect normal/bright    Recent Labs   Lab Test 03/09/23  1451 03/07/23  1121 09/02/22  1053   HGB 14.0  --   --      --   --    NA  --  142 139   POTASSIUM  --  4.2 4.1   CR  --  0.69 0.61        Diagnostics:  Recent Results (from the past 168 hour(s))   CBC with platelets    Collection Time: 08/25/23 10:37 AM   Result Value Ref [facility]    WBC Count 7.9 4.0 - 11.0 10e3/uL    RBC Count 4.87 3.80 - 5.20 10e6/uL    Hemoglobin 14.9 11.7 - 15.7 g/dL    Hematocrit 43.8 35.0 - 47.0 %    MCV 90 78 - 100 fL    MCH 30.6 26.5 - 33.0 pg    MCHC 34.0 31.5 - 36.5 g/dL    RDW 12.3 10.0 - 15.0 %    Platelet Count 300 150 - 450 10e3/uL   Basic metabolic panel    Collection Time: 08/25/23 10:37 AM   Result Value Ref Range    Sodium 141 136 - 145 mmol/L    Potassium 4.0 3.4 - 5.3 mmol/L    Chloride 100 98 - 107 mmol/L    Carbon Dioxide (CO2) 26 22 - 29 mmol/L    Anion Gap 15 7 - 15 mmol/L    Urea Nitrogen 24.7 (H) 8.0 - 23.0 mg/dL    Creatinine 0.73 0.51 - 0.95 mg/dL    Calcium 9.9 8.8 - 10.2 mg/dL    Glucose 103 (H) 70 - 99 mg/dL    GFR Estimate 89 >60 mL/min/1.73m2      EKG: appears normal, NSR, normal axis, normal intervals, no acute ST/T changes c/w ischemia, no LVH by voltage criteria, unchanged from previous tracings    Revised Cardiac Risk Index (RCRI):  The patient has the following serious cardiovascular risks for perioperative complications:   - No serious cardiac risks = 0 points     RCRI Interpretation: 0 points: Class I (very low risk - 0.4% complication rate)         Signed Electronically by: Berna Hou MD  Copy of this  evaluation report is provided to requesting physician.      Answers submitted by the patient for this visit:  Patient Health Questionnaire (Submitted on 8/24/2023)  If you checked off any problems, how difficult have these problems made it for you to do your work, take care of things at home, or get along with other people?: Somewhat difficult  PHQ9 TOTAL SCORE: 9  DEYANIRA-7 (Submitted on 8/24/2023)  DEYANIRA 7 TOTAL SCORE: 12

## 2023-08-24 ASSESSMENT — ANXIETY QUESTIONNAIRES
3. WORRYING TOO MUCH ABOUT DIFFERENT THINGS: MORE THAN HALF THE DAYS
IF YOU CHECKED OFF ANY PROBLEMS ON THIS QUESTIONNAIRE, HOW DIFFICULT HAVE THESE PROBLEMS MADE IT FOR YOU TO DO YOUR WORK, TAKE CARE OF THINGS AT HOME, OR GET ALONG WITH OTHER PEOPLE: SOMEWHAT DIFFICULT
1. FEELING NERVOUS, ANXIOUS, OR ON EDGE: MORE THAN HALF THE DAYS
5. BEING SO RESTLESS THAT IT IS HARD TO SIT STILL: SEVERAL DAYS
GAD7 TOTAL SCORE: 12
4. TROUBLE RELAXING: MORE THAN HALF THE DAYS
7. FEELING AFRAID AS IF SOMETHING AWFUL MIGHT HAPPEN: MORE THAN HALF THE DAYS
GAD7 TOTAL SCORE: 12
2. NOT BEING ABLE TO STOP OR CONTROL WORRYING: MORE THAN HALF THE DAYS
6. BECOMING EASILY ANNOYED OR IRRITABLE: SEVERAL DAYS

## 2023-08-24 ASSESSMENT — PATIENT HEALTH QUESTIONNAIRE - PHQ9
10. IF YOU CHECKED OFF ANY PROBLEMS, HOW DIFFICULT HAVE THESE PROBLEMS MADE IT FOR YOU TO DO YOUR WORK, TAKE CARE OF THINGS AT HOME, OR GET ALONG WITH OTHER PEOPLE: SOMEWHAT DIFFICULT
SUM OF ALL RESPONSES TO PHQ QUESTIONS 1-9: 9
SUM OF ALL RESPONSES TO PHQ QUESTIONS 1-9: 9

## 2023-08-25 ENCOUNTER — OFFICE VISIT (OUTPATIENT)
Dept: FAMILY MEDICINE | Facility: OTHER | Age: 68
End: 2023-08-25
Attending: FAMILY MEDICINE
Payer: COMMERCIAL

## 2023-08-25 VITALS
OXYGEN SATURATION: 97 % | DIASTOLIC BLOOD PRESSURE: 80 MMHG | TEMPERATURE: 99 F | HEART RATE: 119 BPM | RESPIRATION RATE: 16 BRPM | HEIGHT: 64 IN | WEIGHT: 156.9 LBS | BODY MASS INDEX: 26.79 KG/M2 | SYSTOLIC BLOOD PRESSURE: 138 MMHG

## 2023-08-25 DIAGNOSIS — E78.5 HYPERLIPIDEMIA, UNSPECIFIED HYPERLIPIDEMIA TYPE: ICD-10-CM

## 2023-08-25 DIAGNOSIS — I10 BENIGN ESSENTIAL HYPERTENSION: ICD-10-CM

## 2023-08-25 DIAGNOSIS — M19.011 ARTHRITIS OF RIGHT SHOULDER REGION: ICD-10-CM

## 2023-08-25 DIAGNOSIS — Z01.818 PRE-OPERATIVE GENERAL PHYSICAL EXAMINATION: Primary | ICD-10-CM

## 2023-08-25 DIAGNOSIS — F41.9 ANXIETY: ICD-10-CM

## 2023-08-25 DIAGNOSIS — E03.9 HYPOTHYROIDISM, UNSPECIFIED TYPE: ICD-10-CM

## 2023-08-25 LAB
ANION GAP SERPL CALCULATED.3IONS-SCNC: 15 MMOL/L (ref 7–15)
BUN SERPL-MCNC: 24.7 MG/DL (ref 8–23)
CALCIUM SERPL-MCNC: 9.9 MG/DL (ref 8.8–10.2)
CHLORIDE SERPL-SCNC: 100 MMOL/L (ref 98–107)
CREAT SERPL-MCNC: 0.73 MG/DL (ref 0.51–0.95)
DEPRECATED HCO3 PLAS-SCNC: 26 MMOL/L (ref 22–29)
ERYTHROCYTE [DISTWIDTH] IN BLOOD BY AUTOMATED COUNT: 12.3 % (ref 10–15)
GFR SERPL CREATININE-BSD FRML MDRD: 89 ML/MIN/1.73M2
GLUCOSE SERPL-MCNC: 103 MG/DL (ref 70–99)
HCT VFR BLD AUTO: 43.8 % (ref 35–47)
HGB BLD-MCNC: 14.9 G/DL (ref 11.7–15.7)
MCH RBC QN AUTO: 30.6 PG (ref 26.5–33)
MCHC RBC AUTO-ENTMCNC: 34 G/DL (ref 31.5–36.5)
MCV RBC AUTO: 90 FL (ref 78–100)
PLATELET # BLD AUTO: 300 10E3/UL (ref 150–450)
POTASSIUM SERPL-SCNC: 4 MMOL/L (ref 3.4–5.3)
RBC # BLD AUTO: 4.87 10E6/UL (ref 3.8–5.2)
SODIUM SERPL-SCNC: 141 MMOL/L (ref 136–145)
WBC # BLD AUTO: 7.9 10E3/UL (ref 4–11)

## 2023-08-25 PROCEDURE — G0463 HOSPITAL OUTPT CLINIC VISIT: HCPCS | Mod: 25

## 2023-08-25 PROCEDURE — 99214 OFFICE O/P EST MOD 30 MIN: CPT | Performed by: FAMILY MEDICINE

## 2023-08-25 PROCEDURE — 82310 ASSAY OF CALCIUM: CPT | Mod: ZL | Performed by: FAMILY MEDICINE

## 2023-08-25 PROCEDURE — 36415 COLL VENOUS BLD VENIPUNCTURE: CPT | Mod: ZL | Performed by: FAMILY MEDICINE

## 2023-08-25 PROCEDURE — 93010 ELECTROCARDIOGRAM REPORT: CPT | Mod: 77 | Performed by: HOSPITALIST

## 2023-08-25 PROCEDURE — 93005 ELECTROCARDIOGRAM TRACING: CPT | Performed by: FAMILY MEDICINE

## 2023-08-25 PROCEDURE — G0463 HOSPITAL OUTPT CLINIC VISIT: HCPCS

## 2023-08-25 PROCEDURE — 85027 COMPLETE CBC AUTOMATED: CPT | Mod: ZL | Performed by: FAMILY MEDICINE

## 2023-08-25 ASSESSMENT — PAIN SCALES - GENERAL: PAINLEVEL: MODERATE PAIN (4)

## 2023-09-06 ENCOUNTER — TRANSFERRED RECORDS (OUTPATIENT)
Dept: HEALTH INFORMATION MANAGEMENT | Facility: CLINIC | Age: 68
End: 2023-09-06

## 2023-09-18 ENCOUNTER — HOSPITAL ENCOUNTER (OUTPATIENT)
Dept: GENERAL RADIOLOGY | Facility: HOSPITAL | Age: 68
Discharge: HOME | End: 2023-09-18
Attending: NURSE PRACTITIONER | Admitting: NURSE PRACTITIONER
Payer: MEDICARE

## 2023-09-18 DIAGNOSIS — M54.2 CERVICALGIA: Primary | ICD-10-CM

## 2023-09-18 DIAGNOSIS — G44.209 TENSION HEADACHE: ICD-10-CM

## 2023-09-18 DIAGNOSIS — M62.838 CERVICAL PARASPINAL MUSCLE SPASM: ICD-10-CM

## 2023-09-18 DIAGNOSIS — M54.2 CERVICALGIA: ICD-10-CM

## 2023-09-18 PROCEDURE — G0463 HOSPITAL OUTPT CLINIC VISIT: HCPCS

## 2023-09-21 ENCOUNTER — TRANSFERRED RECORDS (OUTPATIENT)
Dept: HEALTH INFORMATION MANAGEMENT | Facility: CLINIC | Age: 68
End: 2023-09-21

## 2023-10-05 DIAGNOSIS — E03.9 HYPOTHYROIDISM, UNSPECIFIED TYPE: ICD-10-CM

## 2023-10-09 RX ORDER — LEVOTHYROXINE SODIUM 50 UG/1
TABLET ORAL
Qty: 90 TABLET | Refills: 0 | Status: SHIPPED | OUTPATIENT
Start: 2023-10-09 | End: 2023-10-09

## 2023-10-09 RX ORDER — LEVOTHYROXINE SODIUM 50 UG/1
50 TABLET ORAL DAILY
Qty: 90 TABLET | Refills: 0 | Status: SHIPPED | OUTPATIENT
Start: 2023-10-09 | End: 2024-01-05

## 2023-10-09 NOTE — TELEPHONE ENCOUNTER
Synthroid      Last Written Prescription Date:  7/13/23  Last Fill Quantity: 90,   # refills: 0  Last Office Visit: 8/25/23  Future Office visit:       Routing refill request to provider for review/approval because:

## 2023-10-16 ENCOUNTER — TELEPHONE (OUTPATIENT)
Dept: FAMILY MEDICINE | Facility: OTHER | Age: 68
End: 2023-10-16

## 2023-10-16 DIAGNOSIS — F41.9 ANXIETY: ICD-10-CM

## 2023-10-16 NOTE — TELEPHONE ENCOUNTER
Reason for call:  Medication      Have you contacted your pharmacy? Yes   - PHARMACY DOESN'T HAVE THE CURRENT PRESCRIPTION ON FILE  Medication BUPROPION 150 MG  What Pharmacy do you use? [facility]      (Please note that the turn-around-time for prescriptions is 72 business hours; I am sending your request at this time. SEND TO  [facility]  )

## 2023-10-17 NOTE — TELEPHONE ENCOUNTER
Talked to patient she was confused thought the   300 mg dose was only for a short time but states will stay on the 300 mg

## 2023-10-18 RX ORDER — BUPROPION HYDROCHLORIDE 300 MG/1
300 TABLET ORAL DAILY
Qty: 90 TABLET | Refills: 0 | Status: SHIPPED | OUTPATIENT
Start: 2023-10-18 | End: 2024-01-16

## 2023-10-18 RX ORDER — BUPROPION HYDROCHLORIDE 150 MG/1
TABLET ORAL
Qty: 90 TABLET | Refills: 0 | OUTPATIENT
Start: 2023-10-18

## 2023-10-18 NOTE — TELEPHONE ENCOUNTER
Wellbutrin      Last Written Prescription Date:  3/7/23  Last Fill Quantity: 90,   # refills: 0  Last Office Visit: 8/25/23  Future Office visit:       Routing refill request to provider for review/approval because:

## 2023-10-19 NOTE — TELEPHONE ENCOUNTER
Does she need a refill?  It looks like this was just refilled on 10/18.  If she is going to stay on 300 mg daily, can you let the pharmacy know?   No

## 2023-10-23 DIAGNOSIS — F41.9 ANXIETY: Primary | ICD-10-CM

## 2023-10-23 RX ORDER — LORAZEPAM 0.5 MG/1
TABLET ORAL
Qty: 30 TABLET | Refills: 1 | Status: SHIPPED | OUTPATIENT
Start: 2023-10-23 | End: 2024-02-29

## 2023-10-30 DIAGNOSIS — E78.5 HYPERLIPIDEMIA, UNSPECIFIED HYPERLIPIDEMIA TYPE: ICD-10-CM

## 2023-10-31 RX ORDER — SIMVASTATIN 20 MG
TABLET ORAL
Qty: 90 TABLET | Refills: 0 | Status: SHIPPED | OUTPATIENT
Start: 2023-10-31 | End: 2024-02-06

## 2023-10-31 NOTE — TELEPHONE ENCOUNTER
Simvastatin      Last Written Prescription Date:  4/26/23  Last Fill Quantity: 90,   # refills: 1  Last Office Visit: 8/25/23  Future Office visit:       Routing refill request to provider for review/approval because:

## 2023-11-03 ENCOUNTER — TRANSFERRED RECORDS (OUTPATIENT)
Dept: HEALTH INFORMATION MANAGEMENT | Facility: HOSPITAL | Age: 68
End: 2023-11-03

## 2023-12-19 NOTE — PROGRESS NOTES
[facility]  [address]  [address]  [place] [address]  Phone: [phone]  Primary Provider: Matthew Hou  Pre-op Performing Provider: MATTHEW HOU      PREOPERATIVE EVALUATION:  Today's date: 12/21/2023    Marixa is a 67 year old, presenting for the following:  Pre-Op Exam        Surgical Information:  Surgery/Procedure: Left Rotator Cuff Repair   Surgery Location: [facility], [place], [place]   Surgeon: Dr. Reggie Mckinnon   Surgery Date: 1/8/24  Time of Surgery: TBD  Where patient plans to recover: At home with family  Fax number for surgical facility: on file    Assessment & Plan     The proposed surgical procedure is considered INTERMEDIATE risk.      ICD-10-CM    1. Pre-operative general physical examination  Z01.818 EKG 12-lead complete w/read - (Clinic Performed)     CBC with platelets     Basic metabolic panel     CBC with platelets     Basic metabolic panel      2. Chronic left shoulder pain  M25.512     G89.29       3. Hyperlipidemia, unspecified hyperlipidemia type  E78.5       4. Benign essential hypertension  I10       5. Hypothyroidism, unspecified type  E03.9              - No identified additional risk factors other than previously addressed    Antiplatelet or Anticoagulation Medication Instructions:  Hold all ASA/NSAIDs/Vitamins/Supplements 7-10 days prior to procedure     Additional Medication Instructions:   - SSRIs, SNRIs, TCAs, Antipsychotics: Continue without modification.     RECOMMENDATION:  APPROVAL GIVEN to proceed with proposed procedure, without further diagnostic evaluation.        Subjective       HPI related to upcoming procedure: Left shoulder pain          12/19/2023    10:36 AM   Preop Questions   1. Have you ever had a heart attack or stroke? No   2. Have you ever had surgery on your heart or blood vessels, such as a stent placement, a coronary artery bypass, or surgery on an artery in your head, neck, heart, or legs? No   3. Do you  have chest pain with activity? No   4. Do you have a history of  heart failure? No   5. Do you currently have a cold, bronchitis or symptoms of other infection? No   6. Do you have a cough, shortness of breath, or wheezing? No   7. Do you or anyone in your family have previous history of blood clots? No   8. Do you or does anyone in your family have a serious bleeding problem such as prolonged bleeding following surgeries or cuts? No   9. Have you ever had problems with anemia or been told to take iron pills? No   10. Have you had any abnormal blood loss such as black, tarry or bloody stools, or abnormal vaginal bleeding? No   11. Have you ever had a blood transfusion? YES - after accident and surgery   11a. Have you ever had a transfusion reaction? No   12. Are you willing to have a blood transfusion if it is medically needed before, during, or after your surgery? Yes   13. Have you or any of your relatives ever had problems with anesthesia? No   14. Do you have sleep apnea, excessive snoring or daytime drowsiness? No   15. Do you have any artifical heart valves or other implanted medical devices like a pacemaker, defibrillator, or continuous glucose monitor? No   16. Do you have artificial joints? YES - left knee, right shoulder   17. Are you allergic to latex? No     Health Care Directive:  Patient does not have a Health Care Directive or Living Will: Has will at home    Preoperative Review of :   reviewed - controlled substances reflected in medication list.      Status of Chronic Conditions:  HYPERLIPIDEMIA - Patient has a long history of significant Hyperlipidemia requiring medication for treatment with recent good control. Patient reports no problems or side effects with the medication.     HYPERTENSION - Patient has longstanding history of HTN , currently denies any symptoms referable to elevated blood pressure. Specifically denies chest pain, palpitations, dyspnea, orthopnea, PND or peripheral edema.  Blood pressure readings have been in normal range. Current medication regimen is as listed below. Patient denies any side effects of medication.     HYPOTHYROIDISM - Patient has a longstanding history of chronic Hypothyroidism. Patient has been doing well, noting no tremor, insomnia, hair loss or changes in skin texture. Continues to take medications as directed, without adverse reactions or side effects. Last TSH   Lab Results   Component Value Date    TSH 3.21 03/07/2023   .      Review of Systems  Constitutional, neuro, ENT, endocrine, pulmonary, cardiac, gastrointestinal, genitourinary, musculoskeletal, integument and psychiatric systems are negative, except as otherwise noted.    Patient Active Problem List    Diagnosis Date Noted    Anxiety 06/10/2021     Priority: Medium    Palpitations 04/19/2019     Priority: Medium    PSVT (paroxysmal supraventricular tachycardia) 04/19/2019     Priority: Medium    History of tobacco abuse quitting on 1/1/91 04/19/2019     Priority: Medium    On statin therapy 04/19/2019     Priority: Medium    Post-operative state 09/23/2014     Priority: Medium    JAMIE without CPAP 05/23/2013     Priority: Medium    Deviated nasal septum 05/23/2013     Priority: Medium    Nasal turbinate hypertrophy 05/23/2013     Priority: Medium    Other specified cardiac dysrhythmias(427.89) 05/06/2013     Priority: Medium    Multinodular goiter 01/14/2013     Priority: Medium    Advanced care planning/counseling discussion 03/27/2012     Priority: Medium    Benign essential hypertension 09/21/2010     Priority: Medium    Hyperlipidemia, unspecified hyperlipidemia type 09/21/2010     Priority: Medium    Hypothyroidism 09/21/2010     Priority: Medium    GERD 09/21/2010     Priority: Medium    Osteoarthrosis, unspecified whether generalized or localized, ankle and foot 07/30/2002     Priority: Medium      Past Medical History:   Diagnosis Date    Allergic rhinitis 9/6/2012    DNS (deviated nasal septum)  9/6/2012    Dysphagia 9/6/2012    Esophageal reflux 9/21/2010    Hypertrophy of inferior nasal turbinate 9/6/2012    Multinodular goiter 1/14/2013    JAMIE (obstructive sleep apnea) 9/6/2012    Osteoarthrosis, unspecified whether generalized or localized, ankle and foot 7/30/2002    Other and unspecified hyperlipidemia 9/21/2010    Unspecified acquired hypothyroidism 9/21/2010    Unspecified essential hypertension 9/21/2010    Uvular hypertrophy 9/6/2012     Past Surgical History:   Procedure Laterality Date    AS TOTAL KNEE ARTHROPLASTY Left 2013    childbirth x 3      COLONOSCOPY N/A 01/16/2020    Performed at [facility] by Dr. VALERY Padilla. Removed 1 hyperplastic polyp. F/U 10 years.     fusion 2nd & 3rd metatarsal      LT    GALLBLADDER SURGERY  07/31/2019    hardware removal foot  2004    LT    HERNIA REPAIR      IR CONSULTATION FOR IR EXAM  9/18/2023    TONSILLECTOMY Bilateral 09/23/2014    Procedure: TONSILLECTOMY;  Surgeon: Caroline Gao MD;  Location: [facility] OR    TURBINOPLASTY Bilateral 09/23/2014    Procedure: TURBINOPLASTY;  Surgeon: Caroline Gao MD;  Location: [facility]    UVULOPALATOPHARYNGOPLASTY N/A 09/23/2014    Procedure: UVULOPALATOPHARYNGOPLASTY;  Surgeon: Caroline Gao MD;  Location: [facility]     Current Outpatient Medications   Medication Sig Dispense Refill    buPROPion (WELLBUTRIN XL) 300 MG 24 hr tablet Take 1 tablet (300 mg) by mouth daily 90 tablet 0    cetirizine HCl 10 MG CAPS       clobetasol (TEMOVATE) 0.05 % external ointment       cyclobenzaprine (FLEXERIL) 10 MG tablet Take 1 tablet (10 mg) by mouth 3 times daily as needed for muscle spasms 60 tablet 3    fish oil-omega-3 fatty acids 1000 MG capsule Take 2 g by mouth daily       ibuprofen (ADVIL/MOTRIN) 600 MG tablet Take 1 tablet (600 mg) by mouth 3 times daily as needed for moderate pain 90 tablet 1    levothyroxine (SYNTHROID/LEVOTHROID) 50 MCG tablet Take 1 tablet (50 mcg) by mouth daily 90 tablet 0    LORazepam  (ATIVAN) 0.5 MG tablet TAKE 1 TABLET BY MOUTH TWICE DAILY AS NEEDED 30 tablet 1    simvastatin (ZOCOR) 20 MG tablet TAKE 1 TABLET BY MOUTH AT BEDTIME 90 tablet 0    vitamin D3 (CHOLECALCIFEROL) 1000 units (25 mcg) tablet Take 2 tablets by mouth daily          Allergies   Allergen Reactions    Mold     Seasonal Allergies         Social History     Tobacco Use    Smoking status: Former     Packs/day: 1.00     Years: 10.00     Additional pack years: 0.00     Total pack years: 10.00     Types: Cigarettes     Quit date: 1991     Years since quittin.9    Smokeless tobacco: Never    Tobacco comments:     quit in .   Substance Use Topics    Alcohol use: Yes     Comment: socially 2 drinks/ twice a week     Family History   Problem Relation Age of Onset    Cancer Mother         pancreatic; cause of death    Cerebrovascular Disease Father 66        cause of death    Other - See Comments Maternal Grandmother         goiter    Other - See Comments Sister         graves disease    Thyroid Disease Other         hyperthyroidism    Thyroid Disease Other         hypothyroidsim    Diabetes Other     Heart Disease Sister         myocardial infarction    Asthma No family hx of      History   Drug Use No     Comment: quit 20 years ago         Objective     /82 (BP Location: Right arm, Patient Position: Sitting, Cuff Size: Adult Regular)   Pulse 99   Temp 97.7  F (36.5  C) (Tympanic)   Resp 18   Wt 70.4 kg (155 lb 3.2 oz)   SpO2 96%   BMI 26.64 kg/m      Physical Exam    GENERAL APPEARANCE: healthy, alert and no distress     EYES: EOMI, PERRL     HENT: ear canals and TM's normal and nose and mouth without ulcers or lesions     NECK: no adenopathy     RESP: lungs clear to auscultation - no rales, rhonchi or wheezes     CV: regular rates and rhythm, normal S1 S2, no S3 or S4 and no murmur, click or rub     SKIN: no suspicious lesions or rashes     NEURO: Normal strength and tone, sensory exam grossly normal,  mentation intact and speech normal     PSYCH: mentation appears normal. and affect normal/bright    Recent Labs   Lab Test 08/25/23  1037 03/09/23  1451 03/07/23  1121   HGB 14.9 14.0  --     280  --      --  142   POTASSIUM 4.0  --  4.2   CR 0.73  --  0.69        Diagnostics:  Recent Results (from the past 24 hour(s))   EKG 12-lead complete w/read - (Clinic Performed)    Collection Time: 12/21/23  9:54 AM   Result Value Ref Range    Systolic Blood Pressure  mmHg    Diastolic Blood Pressure  mmHg    Ventricular Rate 90 BPM    Atrial Rate 90 BPM    TN Interval 186 ms    QRS Duration 82 ms     ms    QTc 413 ms    P Axis 66 degrees    R AXIS 27 degrees    T Axis 61 degrees    Interpretation ECG       Sinus rhythm with sinus arrhythmia  Normal ECG  No previous ECGs available     CBC with platelets    Collection Time: 12/21/23 10:02 AM   Result Value Ref Range    WBC Count 6.1 4.0 - 11.0 10e3/uL    RBC Count 4.63 3.80 - 5.20 10e6/uL    Hemoglobin 14.0 11.7 - 15.7 g/dL    Hematocrit 41.0 35.0 - 47.0 %    MCV 89 78 - 100 fL    MCH 30.2 26.5 - 33.0 pg    MCHC 34.1 31.5 - 36.5 g/dL    RDW 12.5 10.0 - 15.0 %    Platelet Count 257 150 - 450 10e3/uL   Basic metabolic panel    Collection Time: 12/21/23 10:02 AM   Result Value Ref Range    Sodium 143 135 - 145 mmol/L    Potassium 4.2 3.4 - 5.3 mmol/L    Chloride 104 98 - 107 mmol/L    Carbon Dioxide (CO2) 24 22 - 29 mmol/L    Anion Gap 15 7 - 15 mmol/L    Urea Nitrogen 12.7 8.0 - 23.0 mg/dL    Creatinine 0.64 0.51 - 0.95 mg/dL    GFR Estimate >90 >60 mL/min/1.73m2    Calcium 9.5 8.8 - 10.2 mg/dL    Glucose 97 70 - 99 mg/dL      EKG: appears normal, NSR, normal axis, normal intervals, no acute ST/T changes c/w ischemia, no LVH by voltage criteria, unchanged from previous tracings    Revised Cardiac Risk Index (RCRI):  The patient has the following serious cardiovascular risks for perioperative complications:   - No serious cardiac risks = 0 points     RCRI  Interpretation: 0 points: Class I (very low risk - 0.4% complication rate)         Signed Electronically by: Berna Hou MD  Copy of this evaluation report is provided to requesting physician.       No

## 2023-12-21 ENCOUNTER — OFFICE VISIT (OUTPATIENT)
Dept: FAMILY MEDICINE | Facility: OTHER | Age: 68
End: 2023-12-21
Attending: FAMILY MEDICINE
Payer: COMMERCIAL

## 2023-12-21 VITALS
HEART RATE: 99 BPM | SYSTOLIC BLOOD PRESSURE: 132 MMHG | BODY MASS INDEX: 26.64 KG/M2 | DIASTOLIC BLOOD PRESSURE: 82 MMHG | WEIGHT: 155.2 LBS | OXYGEN SATURATION: 96 % | RESPIRATION RATE: 18 BRPM | TEMPERATURE: 97.7 F

## 2023-12-21 DIAGNOSIS — I10 BENIGN ESSENTIAL HYPERTENSION: ICD-10-CM

## 2023-12-21 DIAGNOSIS — E03.9 HYPOTHYROIDISM, UNSPECIFIED TYPE: ICD-10-CM

## 2023-12-21 DIAGNOSIS — Z01.818 PRE-OPERATIVE GENERAL PHYSICAL EXAMINATION: Primary | ICD-10-CM

## 2023-12-21 DIAGNOSIS — M25.512 CHRONIC LEFT SHOULDER PAIN: ICD-10-CM

## 2023-12-21 DIAGNOSIS — E78.5 HYPERLIPIDEMIA, UNSPECIFIED HYPERLIPIDEMIA TYPE: ICD-10-CM

## 2023-12-21 DIAGNOSIS — G89.29 CHRONIC LEFT SHOULDER PAIN: ICD-10-CM

## 2023-12-21 LAB
ANION GAP SERPL CALCULATED.3IONS-SCNC: 15 MMOL/L (ref 7–15)
BUN SERPL-MCNC: 12.7 MG/DL (ref 8–23)
CALCIUM SERPL-MCNC: 9.5 MG/DL (ref 8.8–10.2)
CHLORIDE SERPL-SCNC: 104 MMOL/L (ref 98–107)
CREAT SERPL-MCNC: 0.64 MG/DL (ref 0.51–0.95)
DEPRECATED HCO3 PLAS-SCNC: 24 MMOL/L (ref 22–29)
EGFRCR SERPLBLD CKD-EPI 2021: >90 ML/MIN/1.73M2
ERYTHROCYTE [DISTWIDTH] IN BLOOD BY AUTOMATED COUNT: 12.5 % (ref 10–15)
GLUCOSE SERPL-MCNC: 97 MG/DL (ref 70–99)
HCT VFR BLD AUTO: 41 % (ref 35–47)
HGB BLD-MCNC: 14 G/DL (ref 11.7–15.7)
MCH RBC QN AUTO: 30.2 PG (ref 26.5–33)
MCHC RBC AUTO-ENTMCNC: 34.1 G/DL (ref 31.5–36.5)
MCV RBC AUTO: 89 FL (ref 78–100)
PLATELET # BLD AUTO: 257 10E3/UL (ref 150–450)
POTASSIUM SERPL-SCNC: 4.2 MMOL/L (ref 3.4–5.3)
RBC # BLD AUTO: 4.63 10E6/UL (ref 3.8–5.2)
SODIUM SERPL-SCNC: 143 MMOL/L (ref 135–145)
WBC # BLD AUTO: 6.1 10E3/UL (ref 4–11)

## 2023-12-21 PROCEDURE — 93010 ELECTROCARDIOGRAM REPORT: CPT | Mod: 77 | Performed by: INTERNAL MEDICINE

## 2023-12-21 PROCEDURE — 85027 COMPLETE CBC AUTOMATED: CPT | Mod: ZL | Performed by: FAMILY MEDICINE

## 2023-12-21 PROCEDURE — G0463 HOSPITAL OUTPT CLINIC VISIT: HCPCS | Mod: 25

## 2023-12-21 PROCEDURE — 99214 OFFICE O/P EST MOD 30 MIN: CPT | Performed by: FAMILY MEDICINE

## 2023-12-21 PROCEDURE — G0463 HOSPITAL OUTPT CLINIC VISIT: HCPCS

## 2023-12-21 PROCEDURE — 80048 BASIC METABOLIC PNL TOTAL CA: CPT | Mod: ZL | Performed by: FAMILY MEDICINE

## 2023-12-21 PROCEDURE — 36415 COLL VENOUS BLD VENIPUNCTURE: CPT | Mod: ZL | Performed by: FAMILY MEDICINE

## 2023-12-21 PROCEDURE — 93005 ELECTROCARDIOGRAM TRACING: CPT | Performed by: FAMILY MEDICINE

## 2023-12-21 ASSESSMENT — PAIN SCALES - GENERAL: PAINLEVEL: NO PAIN (0)

## 2023-12-26 LAB
ATRIAL RATE - MUSE: 90 BPM
DIASTOLIC BLOOD PRESSURE - MUSE: NORMAL MMHG
INTERPRETATION ECG - MUSE: NORMAL
P AXIS - MUSE: 66 DEGREES
PR INTERVAL - MUSE: 186 MS
QRS DURATION - MUSE: 82 MS
QT - MUSE: 338 MS
QTC - MUSE: 413 MS
R AXIS - MUSE: 27 DEGREES
SYSTOLIC BLOOD PRESSURE - MUSE: NORMAL MMHG
T AXIS - MUSE: 61 DEGREES
VENTRICULAR RATE- MUSE: 90 BPM

## 2024-01-04 DIAGNOSIS — E03.9 HYPOTHYROIDISM, UNSPECIFIED TYPE: ICD-10-CM

## 2024-01-04 NOTE — TELEPHONE ENCOUNTER
Levothyorxine       Last Written Prescription Date:  10/09/2023  Last Fill Quantity: 90,   # refills: 0  Last Office Visit: 12/21/2023  Future Office visit:

## 2024-01-05 RX ORDER — LEVOTHYROXINE SODIUM 50 UG/1
TABLET ORAL
Qty: 90 TABLET | Refills: 0 | Status: SHIPPED | OUTPATIENT
Start: 2024-01-05 | End: 2024-03-28

## 2024-01-13 DIAGNOSIS — F41.9 ANXIETY: ICD-10-CM

## 2024-01-15 NOTE — TELEPHONE ENCOUNTER
Wellbutrin      Last Written Prescription Date:  10/18/23  Last Fill Quantity: 90,   # refills: 0  Last Office Visit: 12/21/23  Future Office visit:

## 2024-01-16 RX ORDER — BUPROPION HYDROCHLORIDE 300 MG/1
300 TABLET ORAL DAILY
Qty: 90 TABLET | Refills: 3 | Status: SHIPPED | OUTPATIENT
Start: 2024-01-16

## 2024-01-30 ENCOUNTER — TRANSFERRED RECORDS (OUTPATIENT)
Dept: HEALTH INFORMATION MANAGEMENT | Facility: CLINIC | Age: 69
End: 2024-01-30

## 2024-02-05 DIAGNOSIS — E78.5 HYPERLIPIDEMIA, UNSPECIFIED HYPERLIPIDEMIA TYPE: ICD-10-CM

## 2024-02-05 NOTE — TELEPHONE ENCOUNTER
Simvastatin  Last Written Prescription Date: 10/31/23  Last Fill Quantity: 90 # of Refills: 0  Last Office Visit: 12/21/23

## 2024-02-06 RX ORDER — SIMVASTATIN 20 MG
TABLET ORAL
Qty: 90 TABLET | Refills: 0 | Status: SHIPPED | OUTPATIENT
Start: 2024-02-06 | End: 2024-04-09

## 2024-02-10 DIAGNOSIS — E78.5 HYPERLIPIDEMIA, UNSPECIFIED HYPERLIPIDEMIA TYPE: ICD-10-CM

## 2024-02-12 RX ORDER — SIMVASTATIN 20 MG
TABLET ORAL
Qty: 90 TABLET | Refills: 0 | OUTPATIENT
Start: 2024-02-12

## 2024-02-15 ENCOUNTER — TRANSFERRED RECORDS (OUTPATIENT)
Dept: HEALTH INFORMATION MANAGEMENT | Facility: HOSPITAL | Age: 69
End: 2024-02-15

## 2024-02-28 DIAGNOSIS — F41.9 ANXIETY: ICD-10-CM

## 2024-02-29 RX ORDER — LORAZEPAM 0.5 MG/1
TABLET ORAL
Qty: 30 TABLET | Refills: 0 | Status: SHIPPED | OUTPATIENT
Start: 2024-02-29 | End: 2024-04-09

## 2024-02-29 NOTE — TELEPHONE ENCOUNTER
Routing refill request to provider for review/approval because:  Drug not on the [facility], [facility] or [facility] refill protocol or controlled substance

## 2024-02-29 NOTE — TELEPHONE ENCOUNTER
Ativan      Last Written Prescription Date:  10/23/23  Last Fill Quantity: 30,   # refills: 1  Last Office Visit: 12/21/23  Future Office visit:       Routing refill request to provider for review/approval because:

## 2024-03-28 DIAGNOSIS — E03.9 HYPOTHYROIDISM, UNSPECIFIED TYPE: ICD-10-CM

## 2024-03-28 RX ORDER — LEVOTHYROXINE SODIUM 50 UG/1
TABLET ORAL
Qty: 90 TABLET | Refills: 0 | Status: SHIPPED | OUTPATIENT
Start: 2024-03-28 | End: 2024-04-09

## 2024-03-28 NOTE — TELEPHONE ENCOUNTER
LEVOTHYROXINE 0.05MG (50MCG) TAB         Last Written Prescription Date:  1/5/24  Last Fill Quantity: 90,   # refills: 0  Last Office Visit: 12/21/23  Future Office visit:       Routing refill request to provider for review/approval because:    Thyroid Protocol [id number]/28/2024 09:30 AM   Protocol Details Normal TSH on file in past 12 months     TSH   Date Value Ref Range Status   03/07/2023 3.21 0.30 - 4.20 uIU/mL Final   09/02/2022 3.28 0.40 - 4.00 mU/L Final   06/10/2021 2.07 0.40 - 4.00 mU/L Final

## 2024-03-28 NOTE — TELEPHONE ENCOUNTER
Patient is overdue for lab.  Please schedule patient for office visit, then please route task back to me to approve refill once appointment scheduled.

## 2024-04-08 NOTE — PROGRESS NOTES
"  Assessment & Plan     1. Hyperlipidemia, unspecified hyperlipidemia type  Labs updated, medication refilled.  Follow-up in six months, sooner as needed.  - simvastatin (ZOCOR) 20 MG tablet; Take 1 tablet (20 mg) by mouth at bedtime  Dispense: 90 tablet; Refill: 3  - Lipid Profile (Chol, Trig, HDL, LDL calc); Future  - ALT; Future  - Lipid Profile (Chol, Trig, HDL, LDL calc)  - ALT    2. Benign essential hypertension  As above  - Basic metabolic panel; Future  - Basic metabolic panel    3. Anxiety  Refilled  - LORazepam (ATIVAN) 0.5 MG tablet; TAKE 1 TABLET BY MOUTH TWICE DAILY AS NEEDED  Dispense: 30 tablet; Refill: 2    4. Hypothyroidism, unspecified type  As above  - levothyroxine (SYNTHROID/LEVOTHROID) 50 MCG tablet; Take 1 tablet (50 mcg) by mouth daily  Dispense: 90 tablet; Refill: 3  - TSH with free T4 reflex; Future  - TSH with free T4 reflex    5. Seasonal allergic rhinitis, unspecified trigger  Refilled  - cetirizine HCl 10 MG CAPS; Take 1 capsule (10 mg) by mouth daily  Dispense: 30 capsule; Refill: 2       The longitudinal plan of care for the diagnosis(es)/condition(s) as documented were addressed during this visit. Due to the added complexity in care, I will continue to support Marixa in the subsequent management and with ongoing continuity of care.       BMI  Estimated body mass index is 26.43 kg/m  as calculated from the following:    Height as of 8/25/23: 1.626 m (5' 4\").    Weight as of this encounter: 69.9 kg (154 lb).       No follow-ups on file.      Erica Calvin is a 68 year old, presenting for the following health issues:  Chronic Disease Management    HPI     Hyperlipidemia Follow-Up    Are you regularly taking any medication or supplement to lower your cholesterol?   Yes- Zocor  20 mg  Are you having muscle aches or other side effects that you think could be caused by your cholesterol lowering medication?  No    Hypertension Follow-up    Do you check your blood pressure regularly " outside of the clinic? Yes   Are you following a low salt diet? Yes  Are your blood pressures ever more than 140 on the top number (systolic) OR more   than 90 on the bottom number (diastolic), for example 140/90? Yes    Anxiety   How are you doing with your anxiety since your last visit? Worsened   Are you having other symptoms that might be associated with anxiety? No  Have you had a significant life event? OTHER: Son is missing    Are you feeling depressed? Yes:     Do you have any concerns with your use of alcohol or other drugs? No    Social History     Tobacco Use    Smoking status: Former     Packs/day: 1.00     Years: 10.00     Additional pack years: 0.00     Total pack years: 10.00     Types: Cigarettes     Quit date: 1991     Years since quittin.2     Passive exposure: Past    Smokeless tobacco: Never    Tobacco comments:     quit in .   Vaping Use    Vaping Use: Never used   Substance Use Topics    Alcohol use: Yes     Comment: socially 2 drinks/ twice a week    Drug use: No     Comment: quit 20 years ago         3/7/2023    10:55 AM 2023    11:25 PM 2024    10:28 AM   DEYANIRA-7 SCORE   Total Score  12 (moderate anxiety) 8 (mild anxiety)   Total Score 7 12 8         3/7/2023    10:55 AM 2023    11:20 PM 2024    10:27 AM   PHQ   PHQ-9 Total Score 8 9 8   Q9: Thoughts of better off dead/self-harm past 2 weeks Not at all Not at all Not at all         2024    10:27 AM   Last PHQ-9   1.  Little interest or pleasure in doing things 2   2.  Feeling down, depressed, or hopeless 2   3.  Trouble falling or staying asleep, or sleeping too much 1   4.  Feeling tired or having little energy 1   5.  Poor appetite or overeating 1   6.  Feeling bad about yourself 0   7.  Trouble concentrating 1   8.  Moving slowly or restless 0   Q9: Thoughts of better off dead/self-harm past 2 weeks 0   PHQ-9 Total Score 8         2024    10:28 AM   DEYANIRA-7    1. Feeling nervous, anxious, or on edge 2    2. Not being able to stop or control worrying 1   3. Worrying too much about different things 1   4. Trouble relaxing 1   5. Being so restless that it is hard to sit still 1   6. Becoming easily annoyed or irritable 1   7. Feeling afraid, as if something awful might happen 1   DEYANIRA-7 Total Score 8   If you checked any problems, how difficult have they made it for you to do your work, take care of things at home, or get along with other people? Not difficult at all     Hypothyroidism Follow-up    Since last visit, patient describes the following symptoms: Weight stable, no hair loss, no skin changes, no constipation, no loose stools    Patient notes seasonal allergies are flaring.  She wonders if she should restart cetirizine.          Review of Systems  Constitutional, HEENT, cardiovascular, pulmonary, gi and gu systems are negative, except as otherwise noted.      Objective    /87 (BP Location: Right arm, Patient Position: Sitting, Cuff Size: Adult Regular)   Pulse 80   Temp 98.5  F (36.9  C) (Tympanic)   Resp 16   Wt 69.9 kg (154 lb)   SpO2 99%   Breastfeeding No   BMI 26.43 kg/m    Body mass index is 26.43 kg/m .  Physical Exam   GENERAL: alert and no distress  HENT: ear canals and TM's normal, nose and mouth without ulcers or lesions  NECK: no adenopathy, no asymmetry, masses, or scars  RESP: lungs clear to auscultation - no rales, rhonchi or wheezes  CV: regular rates and rhythm, normal S1 S2, no S3 or S4, and no murmur, click or rub  PSYCH: mentation appears normal, affect normal/bright          Signed Electronically by: Berna Hou MD      Answers submitted by the patient for this visit:  Patient Health Questionnaire (Submitted on 4/9/2024)  If you checked off any problems, how difficult have these problems made it for you to do your work, take care of things at home, or get along with other people?: Somewhat difficult  PHQ9 TOTAL SCORE: 8  DEYANIRA-7 (Submitted on 4/9/2024)  DEYANIRA 7 TOTAL SCORE:  8

## 2024-04-09 ENCOUNTER — OFFICE VISIT (OUTPATIENT)
Dept: FAMILY MEDICINE | Facility: OTHER | Age: 69
End: 2024-04-09
Attending: FAMILY MEDICINE
Payer: COMMERCIAL

## 2024-04-09 VITALS
SYSTOLIC BLOOD PRESSURE: 137 MMHG | DIASTOLIC BLOOD PRESSURE: 87 MMHG | WEIGHT: 154 LBS | RESPIRATION RATE: 16 BRPM | HEART RATE: 80 BPM | OXYGEN SATURATION: 99 % | BODY MASS INDEX: 26.43 KG/M2 | TEMPERATURE: 98.5 F

## 2024-04-09 DIAGNOSIS — E78.5 HYPERLIPIDEMIA, UNSPECIFIED HYPERLIPIDEMIA TYPE: Primary | ICD-10-CM

## 2024-04-09 DIAGNOSIS — F41.9 ANXIETY: ICD-10-CM

## 2024-04-09 DIAGNOSIS — I10 BENIGN ESSENTIAL HYPERTENSION: ICD-10-CM

## 2024-04-09 DIAGNOSIS — E03.9 HYPOTHYROIDISM, UNSPECIFIED TYPE: ICD-10-CM

## 2024-04-09 DIAGNOSIS — J30.2 SEASONAL ALLERGIC RHINITIS, UNSPECIFIED TRIGGER: ICD-10-CM

## 2024-04-09 LAB
ALT SERPL W P-5'-P-CCNC: 21 U/L (ref 0–50)
ANION GAP SERPL CALCULATED.3IONS-SCNC: 10 MMOL/L (ref 7–15)
BUN SERPL-MCNC: 12.6 MG/DL (ref 8–23)
CALCIUM SERPL-MCNC: 9.4 MG/DL (ref 8.8–10.2)
CHLORIDE SERPL-SCNC: 102 MMOL/L (ref 98–107)
CHOLEST SERPL-MCNC: 201 MG/DL
CREAT SERPL-MCNC: 0.62 MG/DL (ref 0.51–0.95)
DEPRECATED HCO3 PLAS-SCNC: 28 MMOL/L (ref 22–29)
EGFRCR SERPLBLD CKD-EPI 2021: >90 ML/MIN/1.73M2
FASTING STATUS PATIENT QL REPORTED: NO
GLUCOSE SERPL-MCNC: 101 MG/DL (ref 70–99)
HDLC SERPL-MCNC: 71 MG/DL
HOLD SPECIMEN: NORMAL
LDLC SERPL CALC-MCNC: 110 MG/DL
NONHDLC SERPL-MCNC: 130 MG/DL
POTASSIUM SERPL-SCNC: 4.3 MMOL/L (ref 3.4–5.3)
SODIUM SERPL-SCNC: 140 MMOL/L (ref 135–145)
TRIGL SERPL-MCNC: 99 MG/DL
TSH SERPL DL<=0.005 MIU/L-ACNC: 2.41 UIU/ML (ref 0.3–4.2)

## 2024-04-09 PROCEDURE — 82565 ASSAY OF CREATININE: CPT | Mod: ZL | Performed by: FAMILY MEDICINE

## 2024-04-09 PROCEDURE — G0463 HOSPITAL OUTPT CLINIC VISIT: HCPCS

## 2024-04-09 PROCEDURE — G2211 COMPLEX E/M VISIT ADD ON: HCPCS | Performed by: FAMILY MEDICINE

## 2024-04-09 PROCEDURE — 36415 COLL VENOUS BLD VENIPUNCTURE: CPT | Mod: ZL | Performed by: FAMILY MEDICINE

## 2024-04-09 PROCEDURE — 84460 ALANINE AMINO (ALT) (SGPT): CPT | Mod: ZL | Performed by: FAMILY MEDICINE

## 2024-04-09 PROCEDURE — 83718 ASSAY OF LIPOPROTEIN: CPT | Mod: ZL | Performed by: FAMILY MEDICINE

## 2024-04-09 PROCEDURE — 84443 ASSAY THYROID STIM HORMONE: CPT | Mod: ZL | Performed by: FAMILY MEDICINE

## 2024-04-09 PROCEDURE — 82465 ASSAY BLD/SERUM CHOLESTEROL: CPT | Mod: ZL | Performed by: FAMILY MEDICINE

## 2024-04-09 PROCEDURE — 99214 OFFICE O/P EST MOD 30 MIN: CPT | Performed by: FAMILY MEDICINE

## 2024-04-09 RX ORDER — SIMVASTATIN 20 MG
20 TABLET ORAL AT BEDTIME
Qty: 90 TABLET | Refills: 3 | Status: SHIPPED | OUTPATIENT
Start: 2024-04-09

## 2024-04-09 RX ORDER — LORAZEPAM 0.5 MG/1
TABLET ORAL
Qty: 30 TABLET | Refills: 2 | Status: SHIPPED | OUTPATIENT
Start: 2024-04-09

## 2024-04-09 RX ORDER — LEVOTHYROXINE SODIUM 50 UG/1
50 TABLET ORAL DAILY
Qty: 90 TABLET | Refills: 3 | Status: SHIPPED | OUTPATIENT
Start: 2024-04-09

## 2024-04-09 ASSESSMENT — PATIENT HEALTH QUESTIONNAIRE - PHQ9
SUM OF ALL RESPONSES TO PHQ QUESTIONS 1-9: 8
10. IF YOU CHECKED OFF ANY PROBLEMS, HOW DIFFICULT HAVE THESE PROBLEMS MADE IT FOR YOU TO DO YOUR WORK, TAKE CARE OF THINGS AT HOME, OR GET ALONG WITH OTHER PEOPLE: SOMEWHAT DIFFICULT
SUM OF ALL RESPONSES TO PHQ QUESTIONS 1-9: 8

## 2024-04-09 ASSESSMENT — ANXIETY QUESTIONNAIRES
7. FEELING AFRAID AS IF SOMETHING AWFUL MIGHT HAPPEN: SEVERAL DAYS
GAD7 TOTAL SCORE: 8
4. TROUBLE RELAXING: SEVERAL DAYS
1. FEELING NERVOUS, ANXIOUS, OR ON EDGE: MORE THAN HALF THE DAYS
8. IF YOU CHECKED OFF ANY PROBLEMS, HOW DIFFICULT HAVE THESE MADE IT FOR YOU TO DO YOUR WORK, TAKE CARE OF THINGS AT HOME, OR GET ALONG WITH OTHER PEOPLE?: NOT DIFFICULT AT ALL
2. NOT BEING ABLE TO STOP OR CONTROL WORRYING: SEVERAL DAYS
3. WORRYING TOO MUCH ABOUT DIFFERENT THINGS: SEVERAL DAYS
5. BEING SO RESTLESS THAT IT IS HARD TO SIT STILL: SEVERAL DAYS
7. FEELING AFRAID AS IF SOMETHING AWFUL MIGHT HAPPEN: SEVERAL DAYS
IF YOU CHECKED OFF ANY PROBLEMS ON THIS QUESTIONNAIRE, HOW DIFFICULT HAVE THESE PROBLEMS MADE IT FOR YOU TO DO YOUR WORK, TAKE CARE OF THINGS AT HOME, OR GET ALONG WITH OTHER PEOPLE: NOT DIFFICULT AT ALL
6. BECOMING EASILY ANNOYED OR IRRITABLE: SEVERAL DAYS
GAD7 TOTAL SCORE: 8
GAD7 TOTAL SCORE: 8

## 2024-04-09 ASSESSMENT — PAIN SCALES - GENERAL: PAINLEVEL: NO PAIN (0)

## 2024-04-12 ENCOUNTER — TRANSFERRED RECORDS (OUTPATIENT)
Dept: HEALTH INFORMATION MANAGEMENT | Facility: CLINIC | Age: 69
End: 2024-04-12

## 2024-05-05 DIAGNOSIS — J30.2 SEASONAL ALLERGIC RHINITIS, UNSPECIFIED TRIGGER: ICD-10-CM

## 2024-05-06 PROBLEM — M62.81 MUSCLE WEAKNESS: Status: ACTIVE | Noted: 2024-02-08

## 2024-05-06 PROBLEM — M25.512 PAIN IN JOINT OF LEFT SHOULDER: Status: ACTIVE | Noted: 2024-02-08

## 2024-05-06 PROBLEM — M25.612 STIFFNESS OF JOINT, SHOULDER REGION, LEFT: Status: ACTIVE | Noted: 2024-02-08

## 2024-05-06 RX ORDER — CETIRIZINE HYDROCHLORIDE 10 MG/1
10 TABLET ORAL DAILY
Qty: 90 TABLET | Refills: 1 | Status: SHIPPED | OUTPATIENT
Start: 2024-05-06

## 2024-05-06 NOTE — TELEPHONE ENCOUNTER
Zyrtec  Last Written Prescription Date: 4/9/24  Last Fill Quantity: 30 # of Refills: 2  Last Office Visit: 4/9/24

## 2024-05-10 ENCOUNTER — TELEPHONE (OUTPATIENT)
Dept: FAMILY MEDICINE | Facility: OTHER | Age: 69
End: 2024-05-10

## 2024-05-10 NOTE — TELEPHONE ENCOUNTER
Attempt # 1  Outcome: Talked with Patient    Comment: Marixa is going to check with her insurance to see what is covered for physicals and then she will get back to us.   Physical or Medicare Annual Wellness exam with Dr Hou.

## 2024-06-20 DIAGNOSIS — E03.9 HYPOTHYROIDISM, UNSPECIFIED TYPE: ICD-10-CM

## 2024-06-20 RX ORDER — LEVOTHYROXINE SODIUM 50 UG/1
TABLET ORAL
Qty: 90 TABLET | Refills: 3 | OUTPATIENT
Start: 2024-06-20

## 2024-06-22 ENCOUNTER — HEALTH MAINTENANCE LETTER (OUTPATIENT)
Age: 69
End: 2024-06-22

## 2024-08-01 ENCOUNTER — TELEPHONE (OUTPATIENT)
Dept: FAMILY MEDICINE | Facility: OTHER | Age: 69
End: 2024-08-01

## 2024-08-01 NOTE — TELEPHONE ENCOUNTER
2:34 PM    Reason for Call: OVERBOOK    Patient is having the following symptoms: patient needs to be seen for she ripped her toenail off and now it is oozing. days.    The patient is requesting an appointment for Overbook with Dr Ann Morgan    Was an appointment offered for this call? No  If yes : Appointment type              Date    Preferred method for responding to this message: Telephone Call  What is your phone number ?  [phone]    If we cannot reach you directly, may we leave a detailed response at the number you provided? Yes    Can this message wait until your PCP/provider returns, if unavailable today? Provider is in    Linette Awad

## 2024-09-25 ENCOUNTER — TRANSFERRED RECORDS (OUTPATIENT)
Dept: HEALTH INFORMATION MANAGEMENT | Facility: CLINIC | Age: 69
End: 2024-09-25

## 2024-10-10 DIAGNOSIS — F41.9 ANXIETY: ICD-10-CM

## 2024-10-10 RX ORDER — LORAZEPAM 0.5 MG/1
TABLET ORAL
Qty: 30 TABLET | Refills: 2 | Status: SHIPPED | OUTPATIENT
Start: 2024-10-10

## 2024-10-10 NOTE — TELEPHONE ENCOUNTER
Medication refilled.  General letter written, can fill out FMLA papers or can write a more specific letter if a more specific time frame is given.

## 2024-10-10 NOTE — TELEPHONE ENCOUNTER
Patient calling in to clinic to request refill on lorazepam 0.5mg tablets to [facility] in [place]. Refill pended.    Patient is also requesting a letter supporting her son taking a leave of absence from his job as a teacher at [employer] to provide support to patient due to recent loss of , her other son is missing, having custody of her 2 grandchildren, and needing physical help with house maintenance.  She states the school is in support of her son taking a NEAL and is looking for some documentation.  Advised patient to have son speak with HR regarding his time off and possible FMLA.  Patient does not want to put a time frame on the amount of time needed.      Lorapepam 0.5mg      Last Written Prescription Date:  4/9/24  Last Fill Quantity: 30,   # refills: 2  Last Office Visit: 4/9/24  Future Office visit:    Next 5 appointments (look out 90 days)      Nov 19, 2024 10:00 AM  (Arrive by 9:45 AM)  Adult Preventative Visit with Berna Hou MD  [facility] ([facility] ) [address]  [facility] [address]  [phone]             Routing refill request to provider for review/approval because:  Drug not on the [facility], [facility] or [facility] refill protocol or controlled substance

## 2024-10-10 NOTE — LETTER
October 10, 2024      Soraida Mclaughlin  [address] [address]        To Whom It May Concern:    Soraida Mclaughlin should be allowed to take a leave of absence from work.  She has multiple stressors causing worsening mental health.        Sincerely,        Berna Hou MD

## 2024-11-18 SDOH — HEALTH STABILITY: PHYSICAL HEALTH: ON AVERAGE, HOW MANY DAYS PER WEEK DO YOU ENGAGE IN MODERATE TO STRENUOUS EXERCISE (LIKE A BRISK WALK)?: 2 DAYS

## 2024-11-18 SDOH — HEALTH STABILITY: PHYSICAL HEALTH: ON AVERAGE, HOW MANY MINUTES DO YOU ENGAGE IN EXERCISE AT THIS LEVEL?: 30 MIN

## 2024-11-18 ASSESSMENT — PATIENT HEALTH QUESTIONNAIRE - PHQ9
SUM OF ALL RESPONSES TO PHQ QUESTIONS 1-9: 5
10. IF YOU CHECKED OFF ANY PROBLEMS, HOW DIFFICULT HAVE THESE PROBLEMS MADE IT FOR YOU TO DO YOUR WORK, TAKE CARE OF THINGS AT HOME, OR GET ALONG WITH OTHER PEOPLE: SOMEWHAT DIFFICULT
SUM OF ALL RESPONSES TO PHQ QUESTIONS 1-9: 5

## 2024-11-18 ASSESSMENT — SOCIAL DETERMINANTS OF HEALTH (SDOH): HOW OFTEN DO YOU GET TOGETHER WITH FRIENDS OR RELATIVES?: THREE TIMES A WEEK

## 2024-11-19 ENCOUNTER — OFFICE VISIT (OUTPATIENT)
Dept: FAMILY MEDICINE | Facility: OTHER | Age: 69
End: 2024-11-19
Attending: FAMILY MEDICINE
Payer: MEDICARE

## 2024-11-19 VITALS
DIASTOLIC BLOOD PRESSURE: 80 MMHG | WEIGHT: 144.6 LBS | TEMPERATURE: 98.6 F | RESPIRATION RATE: 18 BRPM | SYSTOLIC BLOOD PRESSURE: 148 MMHG | BODY MASS INDEX: 24.69 KG/M2 | OXYGEN SATURATION: 96 % | HEART RATE: 105 BPM | HEIGHT: 64 IN

## 2024-11-19 DIAGNOSIS — Z00.00 ENCOUNTER FOR MEDICARE ANNUAL WELLNESS EXAM: Primary | ICD-10-CM

## 2024-11-19 DIAGNOSIS — I10 BENIGN ESSENTIAL HYPERTENSION: ICD-10-CM

## 2024-11-19 DIAGNOSIS — E78.5 HYPERLIPIDEMIA, UNSPECIFIED HYPERLIPIDEMIA TYPE: ICD-10-CM

## 2024-11-19 DIAGNOSIS — E03.9 HYPOTHYROIDISM, UNSPECIFIED TYPE: ICD-10-CM

## 2024-11-19 DIAGNOSIS — Z23 NEED FOR VACCINATION: ICD-10-CM

## 2024-11-19 DIAGNOSIS — F41.9 ANXIETY: ICD-10-CM

## 2024-11-19 LAB
ALBUMIN SERPL BCG-MCNC: 4.6 G/DL (ref 3.5–5.2)
ALP SERPL-CCNC: 115 U/L (ref 40–150)
ALT SERPL W P-5'-P-CCNC: 22 U/L (ref 0–50)
ANION GAP SERPL CALCULATED.3IONS-SCNC: 14 MMOL/L (ref 7–15)
AST SERPL W P-5'-P-CCNC: 24 U/L (ref 0–45)
BILIRUB SERPL-MCNC: 0.5 MG/DL
BUN SERPL-MCNC: 13.3 MG/DL (ref 8–23)
CALCIUM SERPL-MCNC: 9.6 MG/DL (ref 8.8–10.4)
CHLORIDE SERPL-SCNC: 105 MMOL/L (ref 98–107)
CHOLEST SERPL-MCNC: 206 MG/DL
CREAT SERPL-MCNC: 0.71 MG/DL (ref 0.51–0.95)
EGFRCR SERPLBLD CKD-EPI 2021: >90 ML/MIN/1.73M2
ERYTHROCYTE [DISTWIDTH] IN BLOOD BY AUTOMATED COUNT: 12.8 % (ref 10–15)
FASTING STATUS PATIENT QL REPORTED: YES
FASTING STATUS PATIENT QL REPORTED: YES
GLUCOSE SERPL-MCNC: 98 MG/DL (ref 70–99)
HCO3 SERPL-SCNC: 24 MMOL/L (ref 22–29)
HCT VFR BLD AUTO: 42.5 % (ref 35–47)
HDLC SERPL-MCNC: 86 MG/DL
HGB BLD-MCNC: 14.3 G/DL (ref 11.7–15.7)
LDLC SERPL CALC-MCNC: 107 MG/DL
MCH RBC QN AUTO: 30.6 PG (ref 26.5–33)
MCHC RBC AUTO-ENTMCNC: 33.6 G/DL (ref 31.5–36.5)
MCV RBC AUTO: 91 FL (ref 78–100)
NONHDLC SERPL-MCNC: 120 MG/DL
PLATELET # BLD AUTO: 284 10E3/UL (ref 150–450)
POTASSIUM SERPL-SCNC: 3.9 MMOL/L (ref 3.4–5.3)
PROT SERPL-MCNC: 7.4 G/DL (ref 6.4–8.3)
RBC # BLD AUTO: 4.68 10E6/UL (ref 3.8–5.2)
SODIUM SERPL-SCNC: 143 MMOL/L (ref 135–145)
TRIGL SERPL-MCNC: 64 MG/DL
TSH SERPL DL<=0.005 MIU/L-ACNC: 2.49 UIU/ML (ref 0.3–4.2)
WBC # BLD AUTO: 6.1 10E3/UL (ref 4–11)

## 2024-11-19 PROCEDURE — 82374 ASSAY BLOOD CARBON DIOXIDE: CPT | Mod: ZL | Performed by: FAMILY MEDICINE

## 2024-11-19 PROCEDURE — 82247 BILIRUBIN TOTAL: CPT | Mod: ZL | Performed by: FAMILY MEDICINE

## 2024-11-19 PROCEDURE — 85027 COMPLETE CBC AUTOMATED: CPT | Mod: ZL | Performed by: FAMILY MEDICINE

## 2024-11-19 PROCEDURE — 84450 TRANSFERASE (AST) (SGOT): CPT | Mod: ZL | Performed by: FAMILY MEDICINE

## 2024-11-19 PROCEDURE — 36415 COLL VENOUS BLD VENIPUNCTURE: CPT | Mod: ZL | Performed by: FAMILY MEDICINE

## 2024-11-19 PROCEDURE — 85018 HEMOGLOBIN: CPT | Mod: ZL | Performed by: FAMILY MEDICINE

## 2024-11-19 PROCEDURE — 84443 ASSAY THYROID STIM HORMONE: CPT | Mod: ZL | Performed by: FAMILY MEDICINE

## 2024-11-19 PROCEDURE — 85041 AUTOMATED RBC COUNT: CPT | Mod: ZL | Performed by: FAMILY MEDICINE

## 2024-11-19 PROCEDURE — 82565 ASSAY OF CREATININE: CPT | Mod: ZL | Performed by: FAMILY MEDICINE

## 2024-11-19 PROCEDURE — 84460 ALANINE AMINO (ALT) (SGPT): CPT | Mod: ZL | Performed by: FAMILY MEDICINE

## 2024-11-19 PROCEDURE — 82310 ASSAY OF CALCIUM: CPT | Mod: ZL | Performed by: FAMILY MEDICINE

## 2024-11-19 PROCEDURE — 80061 LIPID PANEL: CPT | Mod: ZL | Performed by: FAMILY MEDICINE

## 2024-11-19 PROCEDURE — 85014 HEMATOCRIT: CPT | Mod: ZL | Performed by: FAMILY MEDICINE

## 2024-11-19 PROCEDURE — 82435 ASSAY OF BLOOD CHLORIDE: CPT | Mod: ZL | Performed by: FAMILY MEDICINE

## 2024-11-19 PROCEDURE — 84478 ASSAY OF TRIGLYCERIDES: CPT | Mod: ZL | Performed by: FAMILY MEDICINE

## 2024-11-19 PROCEDURE — 84295 ASSAY OF SERUM SODIUM: CPT | Mod: ZL | Performed by: FAMILY MEDICINE

## 2024-11-19 PROCEDURE — 85049 AUTOMATED PLATELET COUNT: CPT | Mod: ZL | Performed by: FAMILY MEDICINE

## 2024-11-19 PROCEDURE — 80053 COMPREHEN METABOLIC PANEL: CPT | Mod: ZL | Performed by: FAMILY MEDICINE

## 2024-11-19 PROCEDURE — 82947 ASSAY GLUCOSE BLOOD QUANT: CPT | Mod: ZL | Performed by: FAMILY MEDICINE

## 2024-11-19 PROCEDURE — 82040 ASSAY OF SERUM ALBUMIN: CPT | Mod: ZL | Performed by: FAMILY MEDICINE

## 2024-11-19 RX ORDER — SERTRALINE HYDROCHLORIDE 25 MG/1
25 TABLET, FILM COATED ORAL DAILY
Qty: 30 TABLET | Refills: 2 | Status: SHIPPED | OUTPATIENT
Start: 2024-11-19

## 2024-11-19 ASSESSMENT — ANXIETY QUESTIONNAIRES
1. FEELING NERVOUS, ANXIOUS, OR ON EDGE: SEVERAL DAYS
7. FEELING AFRAID AS IF SOMETHING AWFUL MIGHT HAPPEN: SEVERAL DAYS
IF YOU CHECKED OFF ANY PROBLEMS ON THIS QUESTIONNAIRE, HOW DIFFICULT HAVE THESE PROBLEMS MADE IT FOR YOU TO DO YOUR WORK, TAKE CARE OF THINGS AT HOME, OR GET ALONG WITH OTHER PEOPLE: SOMEWHAT DIFFICULT
GAD7 TOTAL SCORE: 7
5. BEING SO RESTLESS THAT IT IS HARD TO SIT STILL: SEVERAL DAYS
2. NOT BEING ABLE TO STOP OR CONTROL WORRYING: SEVERAL DAYS
GAD7 TOTAL SCORE: 7
6. BECOMING EASILY ANNOYED OR IRRITABLE: SEVERAL DAYS
4. TROUBLE RELAXING: SEVERAL DAYS
3. WORRYING TOO MUCH ABOUT DIFFERENT THINGS: SEVERAL DAYS
8. IF YOU CHECKED OFF ANY PROBLEMS, HOW DIFFICULT HAVE THESE MADE IT FOR YOU TO DO YOUR WORK, TAKE CARE OF THINGS AT HOME, OR GET ALONG WITH OTHER PEOPLE?: SOMEWHAT DIFFICULT
7. FEELING AFRAID AS IF SOMETHING AWFUL MIGHT HAPPEN: SEVERAL DAYS
GAD7 TOTAL SCORE: 7

## 2024-11-19 ASSESSMENT — PAIN SCALES - GENERAL: PAINLEVEL_OUTOF10: NO PAIN (0)

## 2024-11-19 NOTE — PROGRESS NOTES
Preventive Care Visit  [facility]  Berna Hou MD, Family Medicine  Nov 19, 2024  {Provider  Link to SmartSet :145233}    {PROVIDER CHARTING PREFERENCE:647917}    Erica Calvin is a 68 year old, presenting for the following:  Wellness Visit      {ROOMER if patient is in their first year of Medicare a vision screen is required click here to document the Vison screen and then refresh the note to pull in results  :932743}      HPI  ***  {SUPERLIST (Optional):042367}  {additonal problems for provider to add (Optional):981278}    Health Care Directive  Patient has a Health Care Directive on file  Advance care planning document is on file but is outdated.  Patient encouraged to update.      11/18/2024   General Health   How would you rate your overall physical health? Good   Feel stress (tense, anxious, or unable to sleep) Rather much      (!) STRESS CONCERN      11/18/2024   Nutrition   Diet: Regular (no restrictions)            11/18/2024   Exercise   Days per week of moderate/strenous exercise 2 days   Average minutes spent exercising at this level 30 min      (!) EXERCISE CONCERN      11/18/2024   Social Factors   Frequency of gathering with friends or relatives Three times a week   Worry food won't last until get money to buy more No   Food not last or not have enough money for food? No   Do you have housing? (Housing is defined as stable permanent housing and does not include staying ouside in a car, in a tent, in an abandoned building, in an overnight shelter, or couch-surfing.) Yes   Are you worried about losing your housing? No   Lack of transportation? No   Unable to get utilities (heat,electricity)? No            11/18/2024   Fall Risk   Fallen 2 or more times in the past year? No     No    Trouble with walking or balance? No     No        Patient-reported    Multiple values from one day are sorted in reverse-chronological order          11/18/2024   Activities of Daily Living- Home Safety    Needs help with the following daily activites None of the above   Safety concerns in the home No grab bars in the bathroom            11/18/2024   Dental   Dentist two times every year? Yes            11/18/2024   Hearing Screening   Hearing concerns? (!) IT'S HARD TO FOLLOW A CONVERSATION IN A NOISY RESTAURANT OR CROWDED ROOM.            11/18/2024   Driving Risk Screening   Patient/family members have concerns about driving No            11/18/2024   General Alertness/Fatigue Screening   Have you been more tired than usual lately? No            11/18/2024   Urinary Incontinence Screening   Bothered by leaking urine in past 6 months No            11/18/2024   TB Screening   Were you born outside of the [place]? No          Today's PHQ-9 Score:       11/18/2024     4:44 PM   PHQ-9 SCORE   PHQ-9 Total Score MyChart 5 (Mild depression)   PHQ-9 Total Score 5        Patient-reported         11/18/2024   Substance Use   Alcohol more than 3/day or more than 7/wk Yes   How often do you have a drink containing alcohol 2 to 3 times a week   How many alcohol drinks on typical day 1 or 2   How often do you have 5+ drinks at one occasion Less than monthly   Audit 2/3 Score 1   How often not able to stop drinking once started Never   How often failed to do what normally expected Never   How often needed first drink in am after a heavy drinking session Never   How often feeling of guilt or remorse after drinking Less than monthly   How often unable to remember what happened the night before Never   Have you or someone else been injured because of your drinking No   Has anyone been concerned or suggested you cut down on drinking No   TOTAL SCORE - AUDIT 5   Do you have a current opioid prescription? No   How severe/bad is pain from 1 to 10? 1/10   Do you use any other substances recreationally? No        Social History     Tobacco Use    Smoking status: Former     Current packs/day: 0.00     Average packs/day: 1 pack/day for 10.0  years (10.0 ttl pk-yrs)     Types: Cigarettes     Start date: 1981     Quit date: 1991     Years since quittin.9     Passive exposure: Past    Smokeless tobacco: Never    Tobacco comments:     quit in .   Vaping Use    Vaping status: Never Used   Substance Use Topics    Alcohol use: Yes     Comment: socially 2 drinks/ twice a week    Drug use: No     Comment: quit 20 years ago     {Provider  If there are gaps in the social history shown above, please follow the link to update and then refresh the note Link to Social and Substance History :629176}     {Mammogram Decision Support (Optional):012872}      History of abnormal Pap smear: { :100649}        2014    12:00 AM   PAP / HPV   PAP-ABSTRACT See Scanned Document           This result is from an external source.     ASCVD Risk   The 10-year ASCVD risk score (Lenard RUIZ, et al., 2019) is: 10.5%    Values used to calculate the score:      Age: 68 years      Sex: Female      Is Non- : No      Diabetic: No      Tobacco smoker: No      Systolic Blood Pressure: 148 mmHg      Is BP treated: No      HDL Cholesterol: 71 mg/dL      Total Cholesterol: 201 mg/dL    {Link to Fracture Risk Assessment Tool (Optional):384374}    {Provider  REQUIRED FOR AWV Use the storyboard to review patient history, after sections have been marked as reviewed, refresh note to capture documentation:053721}  {Provider   REQUIRED AWV use this link to review and update sexual activity history  after section has been marked as reviewed, refresh note to capture documentation:038267}  Reviewed and updated as needed this visit by Provider   Tobacco  Allergies  Meds  Problems  Med Hx  Surg Hx  Fam Hx            {HISTORY OPTIONS (Optional):334420}  Current providers sharing in care for this patient include:  Patient Care Team:  Berna Hou MD as PCP - General (Family Practice)  Berna Hou MD as Assigned PCP    The following  "health maintenance items are reviewed in Epic and correct as of today:  Health Maintenance   Topic Date Due    ZOSTER IMMUNIZATION (1 of 2) Never done    RSV VACCINE (1 - Risk 60-74 years 1-dose series) Never done    MEDICARE ANNUAL WELLNESS VISIT  Never done    Pneumococcal Vaccine: 65+ Years (2 of 2 - PCV) 11/19/2021    INFLUENZA VACCINE (1) 09/01/2024    COVID-19 Vaccine (5 - 2024-25 season) 09/01/2024    BMP  04/09/2025    LIPID  04/09/2025    TSH W/FREE T4 REFLEX  04/09/2025    DEYANIRA ASSESSMENT  05/19/2025    PHQ-9  05/19/2025    FALL RISK ASSESSMENT  11/19/2025    MAMMO SCREENING  09/25/2026    GLUCOSE  04/09/2027    ADVANCE CARE PLANNING  11/19/2029    COLORECTAL CANCER SCREENING  01/27/2030    DTAP/TDAP/TD IMMUNIZATION (2 - Td or Tdap) 08/01/2034    DEXA  02/24/2037    HEPATITIS C SCREENING  Completed    PHQ-2 (once per calendar year)  Completed    HPV IMMUNIZATION  Aged Out    MENINGITIS IMMUNIZATION  Aged Out    RSV MONOCLONAL ANTIBODY  Aged Out       {ROS Picklists (Optional):867070}     Objective    Exam  BP (!) 148/80 (BP Location: Right arm, Patient Position: Sitting, Cuff Size: Adult Regular)   Pulse 105   Temp 98.6  F (37  C)   Resp 18   Ht 1.626 m (5' 4\")   Wt 65.6 kg (144 lb 9.6 oz)   SpO2 96%   BMI 24.82 kg/m     Estimated body mass index is 24.82 kg/m  as calculated from the following:    Height as of this encounter: 1.626 m (5' 4\").    Weight as of this encounter: 65.6 kg (144 lb 9.6 oz).    Physical Exam  {Exam Choices (Optional):840458}         11/19/2024   Mini Cog   Clock Draw Score 2 Normal   3 Item Recall 3 objects recalled   Mini Cog Total Score 5        {A Mini-Cog total score of 0-2 suggests the possibility of dementia, score of 3-5 suggests no dementia:148649}         Signed Electronically by: Berna Hou MD  {Email feedback regarding this note to [email or web address]   :614995}  "

## 2024-12-18 NOTE — PROGRESS NOTES
"  Assessment & Plan     1. Anxiety (Primary)  No changes, continue current doses of Wellbutrin and Sertraline.  Follow-up in about 5 months for CDM, sooner if sertraline adjustment is needed.      The longitudinal plan of care for the diagnosis(es)/condition(s) as documented were addressed during this visit. Due to the added complexity in care, I will continue to support Marixa in the subsequent management and with ongoing continuity of care.     BMI  Estimated body mass index is 26.16 kg/m  as calculated from the following:    Height as of this encounter: 1.626 m (5' 4\").    Weight as of this encounter: 69.1 kg (152 lb 6.4 oz).       Return in about 5 months (around 2025) for Chronic Disease Management, Medication review.      Erica Calvin is a 68 year old, presenting for the following health issues:  Lipids, Hypertension, Depression, and Anxiety    HPI     Depression and Anxiety   How are you doing with your depression since your last visit? Improved -feeling better  How are you doing with your anxiety since your last visit?  Improved feeling better  Are you having other symptoms that might be associated with depression or anxiety? No  Have you had a significant life event? Grief or Loss   Do you have any concerns with your use of alcohol or other drugs? No  Patient feels the low dose of sertraline is helping to take the edge off, she is not crying daily like she had been.  She thinks the dose is currently adequate.    Social History     Tobacco Use    Smoking status: Former     Current packs/day: 0.00     Average packs/day: 1 pack/day for 10.0 years (10.0 ttl pk-yrs)     Types: Cigarettes     Start date: 1981     Quit date: 1991     Years since quittin.9     Passive exposure: Past    Smokeless tobacco: Never    Tobacco comments:     quit in .   Vaping Use    Vaping status: Never Used   Substance Use Topics    Alcohol use: Yes     Comment: socially 2 drinks/ twice a week    Drug use: " "No     Comment: quit 20 years ago         4/9/2024    10:27 AM 11/18/2024     4:44 PM 12/19/2024    11:21 AM   PHQ   PHQ-9 Total Score 8 5  5    Q9: Thoughts of better off dead/self-harm past 2 weeks Not at all Not at all Not at all       Patient-reported         4/9/2024    10:28 AM 11/19/2024     9:57 AM 12/19/2024    11:22 AM   DEYANIRA-7 SCORE   Total Score 8 (mild anxiety) 7 (mild anxiety) 6 (mild anxiety)   Total Score 8 7  6        Patient-reported         12/19/2024    11:21 AM   Last PHQ-9   1.  Little interest or pleasure in doing things 1   2.  Feeling down, depressed, or hopeless 1   3.  Trouble falling or staying asleep, or sleeping too much 0   4.  Feeling tired or having little energy 1   5.  Poor appetite or overeating 1   6.  Feeling bad about yourself 0   7.  Trouble concentrating 1   8.  Moving slowly or restless 0   Q9: Thoughts of better off dead/self-harm past 2 weeks 0   PHQ-9 Total Score 5        Patient-reported         12/19/2024    11:22 AM   DEYANIRA-7    1. Feeling nervous, anxious, or on edge 1   2. Not being able to stop or control worrying 0   3. Worrying too much about different things 1   4. Trouble relaxing 1   5. Being so restless that it is hard to sit still 1   6. Becoming easily annoyed or irritable 1   7. Feeling afraid, as if something awful might happen 1   DEYANIRA-7 Total Score 6    If you checked any problems, how difficult have they made it for you to do your work, take care of things at home, or get along with other people? Somewhat difficult       Patient-reported       Suicide Assessment Five-step Evaluation and Treatment (SAFE-T)          Review of Systems  Constitutional, HEENT, cardiovascular, pulmonary, gi and gu systems are negative, except as otherwise noted.      Objective    /72 (BP Location: Left arm, Patient Position: Sitting, Cuff Size: Adult Regular)   Pulse 87   Temp 98.6  F (37  C) (Tympanic)   Resp 18   Ht 1.626 m (5' 4\")   Wt 69.1 kg (152 lb 6.4 oz)   " SpO2 98%   BMI 26.16 kg/m    Body mass index is 26.16 kg/m .  Physical Exam   GENERAL: alert and no distress  PSYCH: mentation appears normal, affect normal/bright          Signed Electronically by: Berna Hou MD    Answers submitted by the patient for this visit:  Patient Health Questionnaire (Submitted on 12/19/2024)  If you checked off any problems, how difficult have these problems made it for you to do your work, take care of things at home, or get along with other people?: Somewhat difficult  PHQ9 TOTAL SCORE: 5  Patient Health Questionnaire (G7) (Submitted on 12/19/2024)  DEYANIRA 7 TOTAL SCORE: 6

## 2024-12-19 ENCOUNTER — OFFICE VISIT (OUTPATIENT)
Dept: FAMILY MEDICINE | Facility: OTHER | Age: 69
End: 2024-12-19
Attending: FAMILY MEDICINE
Payer: MEDICARE

## 2024-12-19 VITALS
HEIGHT: 64 IN | HEART RATE: 87 BPM | BODY MASS INDEX: 26.02 KG/M2 | SYSTOLIC BLOOD PRESSURE: 136 MMHG | RESPIRATION RATE: 18 BRPM | OXYGEN SATURATION: 98 % | TEMPERATURE: 98.6 F | WEIGHT: 152.4 LBS | DIASTOLIC BLOOD PRESSURE: 72 MMHG

## 2024-12-19 DIAGNOSIS — F41.9 ANXIETY: Primary | ICD-10-CM

## 2024-12-19 PROCEDURE — G0463 HOSPITAL OUTPT CLINIC VISIT: HCPCS

## 2024-12-19 ASSESSMENT — ANXIETY QUESTIONNAIRES
8. IF YOU CHECKED OFF ANY PROBLEMS, HOW DIFFICULT HAVE THESE MADE IT FOR YOU TO DO YOUR WORK, TAKE CARE OF THINGS AT HOME, OR GET ALONG WITH OTHER PEOPLE?: SOMEWHAT DIFFICULT
IF YOU CHECKED OFF ANY PROBLEMS ON THIS QUESTIONNAIRE, HOW DIFFICULT HAVE THESE PROBLEMS MADE IT FOR YOU TO DO YOUR WORK, TAKE CARE OF THINGS AT HOME, OR GET ALONG WITH OTHER PEOPLE: SOMEWHAT DIFFICULT
4. TROUBLE RELAXING: SEVERAL DAYS
1. FEELING NERVOUS, ANXIOUS, OR ON EDGE: SEVERAL DAYS
7. FEELING AFRAID AS IF SOMETHING AWFUL MIGHT HAPPEN: SEVERAL DAYS
GAD7 TOTAL SCORE: 6
GAD7 TOTAL SCORE: 6
2. NOT BEING ABLE TO STOP OR CONTROL WORRYING: NOT AT ALL
6. BECOMING EASILY ANNOYED OR IRRITABLE: SEVERAL DAYS
5. BEING SO RESTLESS THAT IT IS HARD TO SIT STILL: SEVERAL DAYS
3. WORRYING TOO MUCH ABOUT DIFFERENT THINGS: SEVERAL DAYS
7. FEELING AFRAID AS IF SOMETHING AWFUL MIGHT HAPPEN: SEVERAL DAYS
GAD7 TOTAL SCORE: 6

## 2024-12-19 ASSESSMENT — PAIN SCALES - GENERAL: PAINLEVEL_OUTOF10: MILD PAIN (3)

## 2025-01-15 DIAGNOSIS — F41.9 ANXIETY: ICD-10-CM

## 2025-01-15 RX ORDER — BUPROPION HYDROCHLORIDE 300 MG/1
300 TABLET ORAL DAILY
Qty: 90 TABLET | Refills: 3 | Status: SHIPPED | OUTPATIENT
Start: 2025-01-15

## 2025-01-15 NOTE — TELEPHONE ENCOUNTER
Bupropion 300 mg 24 hr tab      Last Written Prescription Date:  1-16-24  Last Fill Quantity: 90,   # refills: 3  Last Office Visit: 12-19-24

## 2025-02-04 NOTE — NURSING NOTE
"Chief Complaint   Patient presents with     Lipids     Thyroid Problem       Initial /76 (BP Location: Right arm, Patient Position: Chair, Cuff Size: Adult Regular)   Pulse 105   Temp 98.7  F (37.1  C) (Tympanic)   Resp 16   Ht 1.626 m (5' 4\")   Wt 70 kg (154 lb 4.8 oz)   SpO2 97%   BMI 26.49 kg/m   Estimated body mass index is 26.49 kg/m  as calculated from the following:    Height as of this encounter: 1.626 m (5' 4\").    Weight as of this encounter: 70 kg (154 lb 4.8 oz).  Medication Reconciliation: complete  Pamela M. Lechevalier, LPN    "
N/A

## 2025-02-11 DIAGNOSIS — F41.9 ANXIETY: ICD-10-CM

## 2025-02-11 RX ORDER — SERTRALINE HYDROCHLORIDE 25 MG/1
25 TABLET, FILM COATED ORAL DAILY
Qty: 30 TABLET | Refills: 5 | Status: SHIPPED | OUTPATIENT
Start: 2025-02-11

## 2025-02-11 NOTE — TELEPHONE ENCOUNTER
Zoloft      Last Written Prescription Date:  11/19/24  Last Fill Quantity: 30,   # refills: 2  Last Office Visit: 12/19/24  Future Office visit:       Routing refill request to provider for review/approval because:

## 2025-02-11 NOTE — TELEPHONE ENCOUNTER
SSRIs Protocol [id number]/11/2025 10:31 AM   Protocol Details DEYANIRA-7 score of less than 5 in past 6 months.           4/9/2024    10:28 AM 11/19/2024     9:57 AM 12/19/2024    11:22 AM   DEYANIRA-7 SCORE   Total Score 8 (mild anxiety) 7 (mild anxiety) 6 (mild anxiety)   Total Score 8 7  6        Patient-reported

## 2025-06-17 DIAGNOSIS — E78.5 HYPERLIPIDEMIA, UNSPECIFIED HYPERLIPIDEMIA TYPE: ICD-10-CM

## 2025-06-17 DIAGNOSIS — F41.9 ANXIETY: ICD-10-CM

## 2025-06-17 DIAGNOSIS — E03.9 HYPOTHYROIDISM, UNSPECIFIED TYPE: ICD-10-CM

## 2025-06-17 RX ORDER — SIMVASTATIN 20 MG
20 TABLET ORAL AT BEDTIME
Qty: 90 TABLET | Refills: 1 | Status: SHIPPED | OUTPATIENT
Start: 2025-06-17

## 2025-06-17 RX ORDER — LORAZEPAM 0.5 MG/1
0.5 TABLET ORAL
Qty: 30 TABLET | Refills: 1 | Status: SHIPPED | OUTPATIENT
Start: 2025-06-17

## 2025-06-17 RX ORDER — LEVOTHYROXINE SODIUM 50 UG/1
50 TABLET ORAL DAILY
Qty: 90 TABLET | Refills: 1 | Status: SHIPPED | OUTPATIENT
Start: 2025-06-17

## 2025-06-17 NOTE — TELEPHONE ENCOUNTER
LORazepam (ATIVAN) 0.5 MG tablet         Last Written Prescription Date:  5/9/25  Last Fill Quantity: 30,   # refills: 0  Last Office Visit: 12/19/24  Future Office visit:       Routing refill request to provider for review/approval because:  Drug not on the [facility], P or [facility] refill protocol or controlled substance

## 2025-07-29 ENCOUNTER — APPOINTMENT (OUTPATIENT)
Dept: LAB | Facility: OTHER | Age: 70
End: 2025-07-29
Attending: FAMILY MEDICINE
Payer: MEDICARE

## 2025-08-29 ENCOUNTER — OFFICE VISIT (OUTPATIENT)
Dept: FAMILY MEDICINE | Facility: OTHER | Age: 70
End: 2025-08-29
Attending: FAMILY MEDICINE
Payer: MEDICARE

## 2025-08-29 VITALS
DIASTOLIC BLOOD PRESSURE: 74 MMHG | TEMPERATURE: 97.7 F | SYSTOLIC BLOOD PRESSURE: 128 MMHG | HEIGHT: 64 IN | OXYGEN SATURATION: 97 % | RESPIRATION RATE: 16 BRPM | HEART RATE: 74 BPM | WEIGHT: 150 LBS | BODY MASS INDEX: 25.61 KG/M2

## 2025-08-29 DIAGNOSIS — E78.5 HYPERLIPIDEMIA, UNSPECIFIED HYPERLIPIDEMIA TYPE: ICD-10-CM

## 2025-08-29 DIAGNOSIS — E03.9 HYPOTHYROIDISM, UNSPECIFIED TYPE: ICD-10-CM

## 2025-08-29 DIAGNOSIS — H26.9 CATARACT OF BOTH EYES, UNSPECIFIED CATARACT TYPE: ICD-10-CM

## 2025-08-29 DIAGNOSIS — Z01.818 PRE-OPERATIVE GENERAL PHYSICAL EXAMINATION: Primary | ICD-10-CM

## 2025-08-29 DIAGNOSIS — I10 BENIGN ESSENTIAL HYPERTENSION: ICD-10-CM

## 2025-08-29 DIAGNOSIS — F41.9 ANXIETY: ICD-10-CM

## 2025-08-29 LAB
ALBUMIN UR-MCNC: NEGATIVE MG/DL
APPEARANCE UR: CLEAR
BILIRUB UR QL STRIP: NEGATIVE
COLOR UR AUTO: YELLOW
ERYTHROCYTE [DISTWIDTH] IN BLOOD BY AUTOMATED COUNT: 12.9 % (ref 10–15)
GLUCOSE UR STRIP-MCNC: NEGATIVE MG/DL
HCT VFR BLD AUTO: 39.2 % (ref 35–47)
HGB BLD-MCNC: 13.4 G/DL (ref 11.7–15.7)
HGB UR QL STRIP: NEGATIVE
HOLD SPECIMEN: NORMAL
KETONES UR STRIP-MCNC: NEGATIVE MG/DL
LEUKOCYTE ESTERASE UR QL STRIP: NEGATIVE
MCH RBC QN AUTO: 30.4 PG (ref 26.5–33)
MCHC RBC AUTO-ENTMCNC: 34.2 G/DL (ref 31.5–36.5)
MCV RBC AUTO: 88.9 FL (ref 78–100)
NITRATE UR QL: NEGATIVE
PH UR STRIP: 7.5 [PH] (ref 5–7)
PLATELET # BLD AUTO: 258 10E3/UL (ref 150–450)
RBC # BLD AUTO: 4.41 10E6/UL (ref 3.8–5.2)
SP GR UR STRIP: 1.01 (ref 1–1.03)
UROBILINOGEN UR STRIP-ACNC: 0.2 E.U./DL
WBC # BLD AUTO: 6.03 10E3/UL (ref 4–11)

## 2025-08-29 PROCEDURE — 81003 URINALYSIS AUTO W/O SCOPE: CPT | Mod: ZL | Performed by: FAMILY MEDICINE

## 2025-08-29 PROCEDURE — 85027 COMPLETE CBC AUTOMATED: CPT | Mod: ZL | Performed by: FAMILY MEDICINE

## 2025-08-29 PROCEDURE — G0463 HOSPITAL OUTPT CLINIC VISIT: HCPCS

## 2025-08-29 PROCEDURE — 36415 COLL VENOUS BLD VENIPUNCTURE: CPT | Mod: ZL | Performed by: FAMILY MEDICINE

## 2025-08-29 RX ORDER — SIMVASTATIN 20 MG
20 TABLET ORAL AT BEDTIME
Qty: 90 TABLET | Refills: 1 | Status: SHIPPED | OUTPATIENT
Start: 2025-08-29

## 2025-08-29 RX ORDER — LEVOTHYROXINE SODIUM 50 UG/1
50 TABLET ORAL DAILY
Qty: 90 TABLET | Refills: 1 | Status: SHIPPED | OUTPATIENT
Start: 2025-08-29

## 2025-08-29 RX ORDER — LORAZEPAM 0.5 MG/1
0.5 TABLET ORAL
Qty: 30 TABLET | Refills: 1 | Status: SHIPPED | OUTPATIENT
Start: 2025-08-29

## 2025-08-29 ASSESSMENT — ANXIETY QUESTIONNAIRES
7. FEELING AFRAID AS IF SOMETHING AWFUL MIGHT HAPPEN: SEVERAL DAYS
IF YOU CHECKED OFF ANY PROBLEMS ON THIS QUESTIONNAIRE, HOW DIFFICULT HAVE THESE PROBLEMS MADE IT FOR YOU TO DO YOUR WORK, TAKE CARE OF THINGS AT HOME, OR GET ALONG WITH OTHER PEOPLE: SOMEWHAT DIFFICULT
4. TROUBLE RELAXING: SEVERAL DAYS
7. FEELING AFRAID AS IF SOMETHING AWFUL MIGHT HAPPEN: SEVERAL DAYS
GAD7 TOTAL SCORE: 6
2. NOT BEING ABLE TO STOP OR CONTROL WORRYING: SEVERAL DAYS
1. FEELING NERVOUS, ANXIOUS, OR ON EDGE: SEVERAL DAYS
2. NOT BEING ABLE TO STOP OR CONTROL WORRYING: SEVERAL DAYS
GAD7 TOTAL SCORE: 6
5. BEING SO RESTLESS THAT IT IS HARD TO SIT STILL: NOT AT ALL
3. WORRYING TOO MUCH ABOUT DIFFERENT THINGS: SEVERAL DAYS
IF YOU CHECKED OFF ANY PROBLEMS ON THIS QUESTIONNAIRE, HOW DIFFICULT HAVE THESE PROBLEMS MADE IT FOR YOU TO DO YOUR WORK, TAKE CARE OF THINGS AT HOME, OR GET ALONG WITH OTHER PEOPLE: SOMEWHAT DIFFICULT
GAD7 TOTAL SCORE: 6
5. BEING SO RESTLESS THAT IT IS HARD TO SIT STILL: NOT AT ALL
3. WORRYING TOO MUCH ABOUT DIFFERENT THINGS: SEVERAL DAYS
GAD7 TOTAL SCORE: 6
4. TROUBLE RELAXING: SEVERAL DAYS
6. BECOMING EASILY ANNOYED OR IRRITABLE: SEVERAL DAYS
1. FEELING NERVOUS, ANXIOUS, OR ON EDGE: SEVERAL DAYS
8. IF YOU CHECKED OFF ANY PROBLEMS, HOW DIFFICULT HAVE THESE MADE IT FOR YOU TO DO YOUR WORK, TAKE CARE OF THINGS AT HOME, OR GET ALONG WITH OTHER PEOPLE?: SOMEWHAT DIFFICULT
6. BECOMING EASILY ANNOYED OR IRRITABLE: SEVERAL DAYS
7. FEELING AFRAID AS IF SOMETHING AWFUL MIGHT HAPPEN: SEVERAL DAYS

## 2025-08-29 ASSESSMENT — PATIENT HEALTH QUESTIONNAIRE - PHQ9
SUM OF ALL RESPONSES TO PHQ QUESTIONS 1-9: 6
SUM OF ALL RESPONSES TO PHQ QUESTIONS 1-9: 6
10. IF YOU CHECKED OFF ANY PROBLEMS, HOW DIFFICULT HAVE THESE PROBLEMS MADE IT FOR YOU TO DO YOUR WORK, TAKE CARE OF THINGS AT HOME, OR GET ALONG WITH OTHER PEOPLE: SOMEWHAT DIFFICULT
SUM OF ALL RESPONSES TO PHQ QUESTIONS 1-9: 6

## 2025-08-29 ASSESSMENT — PAIN SCALES - GENERAL: PAINLEVEL_OUTOF10: NO PAIN (0)

## 2025-09-04 ENCOUNTER — TELEPHONE (OUTPATIENT)
Dept: FAMILY MEDICINE | Facility: OTHER | Age: 70
End: 2025-09-04